# Patient Record
Sex: MALE | Race: WHITE | NOT HISPANIC OR LATINO | Employment: FULL TIME | URBAN - METROPOLITAN AREA
[De-identification: names, ages, dates, MRNs, and addresses within clinical notes are randomized per-mention and may not be internally consistent; named-entity substitution may affect disease eponyms.]

---

## 2023-09-15 PROBLEM — H91.91 DECREASED HEARING OF RIGHT EAR: Status: ACTIVE | Noted: 2023-09-15

## 2023-09-15 PROBLEM — F41.9 ANXIETY AND DEPRESSION: Status: ACTIVE | Noted: 2023-09-15

## 2023-09-15 PROBLEM — K21.9 ESOPHAGEAL REFLUX: Status: ACTIVE | Noted: 2023-09-15

## 2023-09-15 PROBLEM — R53.83 FATIGUE: Status: ACTIVE | Noted: 2023-09-15

## 2023-09-15 PROBLEM — K22.70 BARRETT'S ESOPHAGUS: Status: ACTIVE | Noted: 2023-09-15

## 2023-09-15 PROBLEM — F33.8 SEASONAL AFFECTIVE DISORDER (CMS-HCC): Status: ACTIVE | Noted: 2023-09-15

## 2023-09-15 PROBLEM — R45.4 IRRITABILITY: Status: ACTIVE | Noted: 2023-09-15

## 2023-09-15 PROBLEM — R25.1 TREMOR: Status: ACTIVE | Noted: 2023-09-15

## 2023-09-15 PROBLEM — F42.8 OBSESSIVE THINKING: Status: ACTIVE | Noted: 2023-09-15

## 2023-09-15 PROBLEM — E78.5 HYPERLIPIDEMIA: Status: ACTIVE | Noted: 2023-09-15

## 2023-09-15 PROBLEM — F32.5 MAJOR DEPRESSION IN REMISSION (CMS-HCC): Status: ACTIVE | Noted: 2023-09-15

## 2023-09-15 PROBLEM — G43.909 MIGRAINE: Status: ACTIVE | Noted: 2023-09-15

## 2023-09-15 PROBLEM — E66.3 OVERWEIGHT: Status: ACTIVE | Noted: 2023-09-15

## 2023-09-15 PROBLEM — H66.92 OTITIS MEDIA OF LEFT EAR: Status: ACTIVE | Noted: 2023-09-15

## 2023-09-15 PROBLEM — F32.A ANXIETY AND DEPRESSION: Status: ACTIVE | Noted: 2023-09-15

## 2023-09-15 RX ORDER — ALPRAZOLAM 1 MG/1
1 TABLET ORAL DAILY PRN
COMMUNITY
Start: 2019-08-21

## 2023-09-15 RX ORDER — ESCITALOPRAM OXALATE 10 MG/1
10 TABLET ORAL DAILY
COMMUNITY
End: 2024-01-24 | Stop reason: WASHOUT

## 2023-09-15 RX ORDER — BUPROPION HYDROCHLORIDE 300 MG/1
300 TABLET ORAL DAILY
COMMUNITY
End: 2023-12-18 | Stop reason: SDUPTHER

## 2023-09-15 RX ORDER — BUPROPION HYDROCHLORIDE 150 MG/1
1 TABLET ORAL DAILY
COMMUNITY
Start: 2019-09-18 | End: 2024-01-24 | Stop reason: WASHOUT

## 2023-09-15 RX ORDER — ESCITALOPRAM OXALATE 5 MG/1
5 TABLET ORAL DAILY
COMMUNITY
End: 2023-12-19

## 2023-09-15 RX ORDER — ESCITALOPRAM OXALATE 20 MG/1
1 TABLET ORAL DAILY
COMMUNITY
Start: 2019-08-21 | End: 2023-12-18 | Stop reason: SDUPTHER

## 2023-10-17 ENCOUNTER — TELEMEDICINE (OUTPATIENT)
Dept: BEHAVIORAL HEALTH | Facility: CLINIC | Age: 43
End: 2023-10-17
Payer: COMMERCIAL

## 2023-10-17 DIAGNOSIS — F41.9 ANXIETY: ICD-10-CM

## 2023-10-17 DIAGNOSIS — F32.5 MAJOR DEPRESSION IN REMISSION (CMS-HCC): ICD-10-CM

## 2023-10-17 DIAGNOSIS — F33.8 SEASONAL AFFECTIVE DISORDER (CMS-HCC): ICD-10-CM

## 2023-10-17 DIAGNOSIS — F42.8 OBSESSIVE THINKING: ICD-10-CM

## 2023-10-17 PROCEDURE — 99214 OFFICE O/P EST MOD 30 MIN: CPT | Performed by: NURSE PRACTITIONER

## 2023-10-17 ASSESSMENT — PATIENT HEALTH QUESTIONNAIRE - PHQ9
6. FEELING BAD ABOUT YOURSELF - OR THAT YOU ARE A FAILURE OR HAVE LET YOURSELF OR YOUR FAMILY DOWN: NOT AT ALL
2. FEELING DOWN, DEPRESSED OR HOPELESS: NOT AT ALL
3. TROUBLE FALLING OR STAYING ASLEEP OR SLEEPING TOO MUCH: NOT AT ALL
1. LITTLE INTEREST OR PLEASURE IN DOING THINGS: NOT AT ALL
5. POOR APPETITE OR OVEREATING: NOT AT ALL
9. THOUGHTS THAT YOU WOULD BE BETTER OFF DEAD, OR OF HURTING YOURSELF: NOT AT ALL
7. TROUBLE CONCENTRATING ON THINGS, SUCH AS READING THE NEWSPAPER OR WATCHING TELEVISION: NOT AT ALL
10. IF YOU CHECKED OFF ANY PROBLEMS, HOW DIFFICULT HAVE THESE PROBLEMS MADE IT FOR YOU TO DO YOUR WORK, TAKE CARE OF THINGS AT HOME, OR GET ALONG WITH OTHER PEOPLE: NOT DIFFICULT AT ALL
4. FEELING TIRED OR HAVING LITTLE ENERGY: NOT AT ALL
8. MOVING OR SPEAKING SO SLOWLY THAT OTHER PEOPLE COULD HAVE NOTICED. OR THE OPPOSITE, BEING SO FIGETY OR RESTLESS THAT YOU HAVE BEEN MOVING AROUND A LOT MORE THAN USUAL: NOT AT ALL

## 2023-10-17 ASSESSMENT — ANXIETY QUESTIONNAIRES
GAD7 TOTAL SCORE: 0
4. TROUBLE RELAXING: NOT AT ALL
2. NOT BEING ABLE TO STOP OR CONTROL WORRYING: NOT AT ALL
3. WORRYING TOO MUCH ABOUT DIFFERENT THINGS: NOT AT ALL
1. FEELING NERVOUS, ANXIOUS, OR ON EDGE: NOT AT ALL
7. FEELING AFRAID AS IF SOMETHING AWFUL MIGHT HAPPEN: NOT AT ALL
6. BECOMING EASILY ANNOYED OR IRRITABLE: NOT AT ALL
IF YOU CHECKED OFF ANY PROBLEMS ON THIS QUESTIONNAIRE, HOW DIFFICULT HAVE THESE PROBLEMS MADE IT FOR YOU TO DO YOUR WORK, TAKE CARE OF THINGS AT HOME, OR GET ALONG WITH OTHER PEOPLE: NOT DIFFICULT AT ALL
5. BEING SO RESTLESS THAT IT IS HARD TO SIT STILL: NOT AT ALL

## 2023-10-17 NOTE — PROGRESS NOTES
"Adult Ambulatory Psychiatry Progress Note      Assessment/Plan     Impression:  Boo De La O is a 42 y.o. male domiciled , employed as manager at [employer], who presents for follow up with CC of \"I am doing great!\"    Plan: No changes to tx plan. Returns in 2 months  Medication: Continues taking Lexapro 25mg every day, Wellbutrin XL 300mg every day, Xanax 1mg PRN (no refills).  Diagnoses and all orders for this visit:  Major depression in remission (CMS/HCC)  Obsessive thinking  Seasonal affective disorder (CMS/HCC)  Anxiety      Therapy: none    Other: n/a    Subjective   HPI:  \"Both the patient, and family and caregivers and guardians as appropriate, were informed of the current need to conduct treatment via telephone. I have confirmed the patient's identity via the following (minimum of three) acceptable identifiers as per  Policy PH-9: home address, , S.S.\"     Present Illness - anxiety and depression     Recent psychiatric symptoms (pertinent positives and negatives) - reports mentally feeling stable. Reports does couples therapy with his wife who's confirmed mood swings is d/t perimenopausal hormonal changes and is not as volatile as before. He will periodically see the individual therapist when his wife is traveling for work, but otherwise she sees the individual therapist on her own, while both the couples therapist and individual therapist converse together. Reports a lot of his wife's ongoing issues she brings up in therapy is her own past issues/trauma that she puts on the patient currently - blaming him and makes him out to be a problem in the marriage, and interestingly the therapists question him if he has issues and wants to talk about his past and he has said countless times that he has let go of them and not let them impact him currently. Reports both anxiety and depression remain stable. Reports both mental/physical fatigue and energy levels are not am issue. Denies S.A.D. even with the " fall weather, and when the temps went into the 70s and lata, he felt the mood shift down, but he made it through the down turn. Reports noticing his 10 yo. Daughter's mood/anxiety is up and down now and wonders if it is hormonal but also wonders if it is genetic as both sides of family mental history plays a role but also her lactose and gluten intolerance is an issue. Sleep averages 7.5-9 hrs and feels rested. Continues to deny experiencing racing, obsessive, intrusive or ruminating thoughts. Appetite has been 'normal.' Energy levels and mental/physical fatigue are 'good.'     Onset/timeframe - 6 weeks  Type - denies  Duration - denies  Aggravating and/or relieving factors/triggers - ongoing support with wife and her doing therapy, continually working on her own mental health and still maintaining healthy boundaries with himself in relation to her. Denies issues.  Related symptoms  Treatment and treatment changes (new meds, dosage increases or decreases, med compliance, therapy frequency, etc.) (Past and Recent) - Lexapro 25mg QD, Wellbutrin XL 300mg QD, Xanax 1mg PRN (no refills). Seeing BRIANNA Cruz-C every 6 weeks as a check in, while doing couple's therapy with wife as well, and the 2 therapists discuss the issues to remain on the same page.     Issues: Denies SI/HI/AVH, currently.           Review of Systems   Respiratory: Negative.     Cardiovascular: Negative.    Gastrointestinal: Negative.    Musculoskeletal: Negative.    Psychiatric/Behavioral: Negative.           Objective   Mental Status Exam:  General Appearance: Well groomed, appropriate eye contact  Attitude/Behavior: Cooperative  Motor: No psychomotor agitation or retardation, no tremor or other abnormal movements  Speech: Normal rate, volume, prosody  Gait/Station: Other:(comment) (sitting in car seat over ZOOM)  Mood: 'great'  Affect: Euthymic, full-range, Congruent with mood and topic of conversation  Thought Process: Linear, goal  directed  Thought Associations: No loosening of associations  Thought Content: Normal  Perception: No perceptual abnormalities noted  Sensorium: Alert and oriented to person, place, time and situation  Insight: Intact  Judgement: Intact  Testing: N/A    JAMISON-7/PHQ-9 scores reviewed: 0, 0 compared to 0, 0 reflecting stability with anxiety and mood/depression.    Current Medications:  Current Outpatient Medications on File Prior to Visit   Medication Sig Dispense Refill    ALPRAZolam (Xanax) 1 mg tablet Take 1 tablet (1 mg) by mouth once daily as needed for anxiety.      buPROPion XL (Wellbutrin XL) 300 mg 24 hr tablet Take 1 tablet (300 mg) by mouth once daily. Do not crush, chew, or split.      escitalopram (Lexapro) 20 mg tablet Take 1 tablet (20 mg) by mouth once daily.      escitalopram (Lexapro) 5 mg tablet Take 1 tablet (5 mg) by mouth once daily.      buPROPion XL (Wellbutrin XL) 150 mg 24 hr tablet Take 1 tablet (150 mg) by mouth once daily.      escitalopram (Lexapro) 10 mg tablet Take 1 tablet (10 mg) by mouth once daily.       No current facility-administered medications on file prior to visit.       Lab Review:   not applicable      Time Spent:    Prep time: 1 min.  Direct patient time: 25 min.  Documentation time: 4 min.   Total time: 30 min.    Next Appointment:  Follow up in about 2 months (around 12/17/2023).

## 2023-10-18 ASSESSMENT — ENCOUNTER SYMPTOMS
MUSCULOSKELETAL NEGATIVE: 1
GASTROINTESTINAL NEGATIVE: 1
PSYCHIATRIC NEGATIVE: 1
RESPIRATORY NEGATIVE: 1
CARDIOVASCULAR NEGATIVE: 1

## 2023-12-18 ENCOUNTER — TELEMEDICINE (OUTPATIENT)
Dept: BEHAVIORAL HEALTH | Facility: CLINIC | Age: 43
End: 2023-12-18
Payer: COMMERCIAL

## 2023-12-18 DIAGNOSIS — F32.1 CURRENT MODERATE EPISODE OF MAJOR DEPRESSIVE DISORDER, UNSPECIFIED WHETHER RECURRENT (MULTI): ICD-10-CM

## 2023-12-18 DIAGNOSIS — F42.8 OBSESSIVE THINKING: ICD-10-CM

## 2023-12-18 DIAGNOSIS — F33.8 SEASONAL AFFECTIVE DISORDER (CMS-HCC): Primary | ICD-10-CM

## 2023-12-18 DIAGNOSIS — F41.9 ANXIETY: ICD-10-CM

## 2023-12-18 PROCEDURE — 99214 OFFICE O/P EST MOD 30 MIN: CPT | Performed by: NURSE PRACTITIONER

## 2023-12-18 RX ORDER — ESCITALOPRAM OXALATE 20 MG/1
20 TABLET ORAL DAILY
Qty: 90 TABLET | Refills: 3 | Status: SHIPPED | OUTPATIENT
Start: 2023-12-18 | End: 2024-12-17

## 2023-12-18 RX ORDER — BUPROPION HYDROCHLORIDE 300 MG/1
300 TABLET ORAL DAILY
Qty: 90 TABLET | Refills: 3 | Status: SHIPPED | OUTPATIENT
Start: 2023-12-18 | End: 2023-12-19

## 2023-12-18 RX ORDER — ARIPIPRAZOLE 2 MG/1
1 TABLET ORAL DAILY
Qty: 30 TABLET | Refills: 0 | Status: SHIPPED | OUTPATIENT
Start: 2023-12-18 | End: 2024-01-24 | Stop reason: WASHOUT

## 2023-12-18 ASSESSMENT — PATIENT HEALTH QUESTIONNAIRE - PHQ9
5. POOR APPETITE OR OVEREATING: MORE THAN HALF THE DAYS
4. FEELING TIRED OR HAVING LITTLE ENERGY: MORE THAN HALF THE DAYS
9. THOUGHTS THAT YOU WOULD BE BETTER OFF DEAD, OR OF HURTING YOURSELF: NOT AT ALL
8. MOVING OR SPEAKING SO SLOWLY THAT OTHER PEOPLE COULD HAVE NOTICED. OR THE OPPOSITE, BEING SO FIGETY OR RESTLESS THAT YOU HAVE BEEN MOVING AROUND A LOT MORE THAN USUAL: SEVERAL DAYS
6. FEELING BAD ABOUT YOURSELF - OR THAT YOU ARE A FAILURE OR HAVE LET YOURSELF OR YOUR FAMILY DOWN: SEVERAL DAYS
10. IF YOU CHECKED OFF ANY PROBLEMS, HOW DIFFICULT HAVE THESE PROBLEMS MADE IT FOR YOU TO DO YOUR WORK, TAKE CARE OF THINGS AT HOME, OR GET ALONG WITH OTHER PEOPLE: SOMEWHAT DIFFICULT
1. LITTLE INTEREST OR PLEASURE IN DOING THINGS: MORE THAN HALF THE DAYS
3. TROUBLE FALLING OR STAYING ASLEEP OR SLEEPING TOO MUCH: NEARLY EVERY DAY
7. TROUBLE CONCENTRATING ON THINGS, SUCH AS READING THE NEWSPAPER OR WATCHING TELEVISION: MORE THAN HALF THE DAYS
2. FEELING DOWN, DEPRESSED OR HOPELESS: MORE THAN HALF THE DAYS

## 2023-12-18 ASSESSMENT — ENCOUNTER SYMPTOMS
CARDIOVASCULAR NEGATIVE: 1
PSYCHIATRIC NEGATIVE: 1
GASTROINTESTINAL NEGATIVE: 1
RESPIRATORY NEGATIVE: 1

## 2023-12-18 ASSESSMENT — ANXIETY QUESTIONNAIRES
5. BEING SO RESTLESS THAT IT IS HARD TO SIT STILL: NOT AT ALL
7. FEELING AFRAID AS IF SOMETHING AWFUL MIGHT HAPPEN: SEVERAL DAYS
IF YOU CHECKED OFF ANY PROBLEMS ON THIS QUESTIONNAIRE, HOW DIFFICULT HAVE THESE PROBLEMS MADE IT FOR YOU TO DO YOUR WORK, TAKE CARE OF THINGS AT HOME, OR GET ALONG WITH OTHER PEOPLE: SOMEWHAT DIFFICULT
GAD7 TOTAL SCORE: 8
1. FEELING NERVOUS, ANXIOUS, OR ON EDGE: MORE THAN HALF THE DAYS
6. BECOMING EASILY ANNOYED OR IRRITABLE: SEVERAL DAYS
2. NOT BEING ABLE TO STOP OR CONTROL WORRYING: SEVERAL DAYS
4. TROUBLE RELAXING: MORE THAN HALF THE DAYS
3. WORRYING TOO MUCH ABOUT DIFFERENT THINGS: SEVERAL DAYS

## 2023-12-18 NOTE — PROGRESS NOTES
"Adult Ambulatory Psychiatry Progress Note      Assessment/Plan     Impression:  Boo De La O is a 43 y.o. male domiciled , employed as manager at [employer], who presents for follow up with CC of \"At end of our last appointment, I noticed I was starting to slow down, where I was becoming slowly less motivated, not wanting to get out of bed. It really hit me at the end of November, I noticed I was maybe even wavy of trending better, than I would drop down. I had not been working out. I was not feeling  my best with belly issues - I was nauseated, cramping and it lasted me awhile. I felt tired a lot. It lasted me a couple of days.\"    Plan: Reviewed and agreed to adding on low dose of Abilify for seasonal depression (1/2 tab to start) but patient will start taking it only when and if he notices the depression is back and consistently impacting him. No other changes to medications and treatment plan. Next appt in person    Medication: Starts Abilify 2mg (will take 1/2 tablet - 1mg) every day. Continues taking Lexapro 25mg every day, Wellbutrin XL 300mg every day, Xanax 1mg PRN (no refills).    Diagnoses and all orders for this visit:  Seasonal affective disorder (CMS/HCC)  -     ARIPiprazole (Abilify) 2 mg tablet; Take 0.5 tablets (1 mg) by mouth once daily.  -     escitalopram (Lexapro) 20 mg tablet; Take 1 tablet (20 mg) by mouth once daily.  Anxiety  -     ARIPiprazole (Abilify) 2 mg tablet; Take 0.5 tablets (1 mg) by mouth once daily.  -     escitalopram (Lexapro) 20 mg tablet; Take 1 tablet (20 mg) by mouth once daily.  Current moderate episode of major depressive disorder, unspecified whether recurrent (CMS/HCC)  -     ARIPiprazole (Abilify) 2 mg tablet; Take 0.5 tablets (1 mg) by mouth once daily.  -     escitalopram (Lexapro) 20 mg tablet; Take 1 tablet (20 mg) by mouth once daily.  Obsessive thinking      Therapy: none    Other: n/a    Subjective   HPI:  \"Both the patient, and family and caregivers and " "guardians as appropriate, were informed of the current need to conduct treatment via telephone. I have confirmed the patient's identity via the following (minimum of three) acceptable identifiers as per  Policy PH-9: home address, , S.S.\"     Present Illness - seasonal depression     Recent psychiatric symptoms (pertinent positives and negatives) - reports mentally feeling 'off'. Reports his relationship with his wife has been fine as they are doing therapy and 'she's starting to warm up. She had to travel for work and missed several sessions but we are talking and she was open about things.' Reports work is fine, the family is good. Reports normal stressors are manageable but 'now that I have been feeling more down than normal, they are triggering me and I feel like look at me, yay! I feel so great.' Reports appetite has been down in general, and has lost 5 lbs overall since the last appt. Reports feeling unmotivated increasingly, more down, sad. Reports it is harder for him to come home and want to shop and cook for the family. Reports after cooking has no desire to want to clean up and wash dishes, or even 'want to go out into the shop and cut some wood. Nope, I want to just sit in the chair and scroll on my phone instead. And my biggest test is to play online chess and I couldn't concentrate.' Reports noticing that as the efficacy of the medications are not helping with managing his mood, the s/e get worse (eg noticed increased tremors of his hands - reflecting the Wellbutrin), and finds himself when feeling down 'I have had more trouble finding words at work to express myself when talking. The brain is spinning but nothing is flowing out.' Reports in his team, 'we often just have casual b.s. talk and I am just stumbling and stuttering out words.' Feels like he is just now coming back out of a deep low. Reports feeling sleep quality 'has not been great lately. I will have trouble with falling asleep staring " at the ceiling for an hour and other times I will wake up to pee and then my mind is wide awake and I am like fuck I can't fall back to sleep.' Reports other mornings will have trouble wanting to get out of bed. Reports sleep had increased to 9 hours all of last week but still not restful, but most nights were averaging 7 hours. Reports can make it through his work day but is 'at 60% of where it normally would be over the summer', but also notices some seasonal depression where it is so dark at 6pm to 6am, that his energy levels drop off and can feel the mental and physical fatigue hit a wall. Reports a work stress triggered some ruminating thoughts last week 'and I had to take a 1/2 a Xanax to stop them, but if I was not depressed it would not have happened.' Denies racing, obsessive, or intrusive thoughts.     Onset/timeframe - 4 weeks  Type - seasonal depression  Duration - daily  Aggravating and/or relieving factors/triggers - winter time seasonal changes especially the increased dark from 6p-6a is hitting him harder than usual as seasonal depression hits him usually over the summer  Related symptoms  Treatment and treatment changes (new meds, dosage increases or decreases, med compliance, therapy frequency, etc.) (Past and Recent) - Lexapro 25mg QD, Wellbutrin XL 300mg QD, Xanax 1mg PRN (no refills). Seeing BRIANNA Cruz-SLICK every 6 weeks as a check in, while doing couple's therapy with wife as well, and the 2 therapists discuss the issues to remain on the same page.     Issues: Denies SI/HI/AVH, currently.     Reports some family members have been dx with depression even late life depression, including alcoholism.          Review of Systems   Respiratory: Negative.     Cardiovascular: Negative.    Gastrointestinal: Negative.    Neurological: Negative.    Psychiatric/Behavioral: Negative.           Objective   Mental Status Exam:  General Appearance: Well groomed, appropriate eye contact  Attitude/Behavior:  Cooperative, Guarded  Motor: No psychomotor agitation or retardation, no tremor or other abnormal movements  Speech: Normal rate, volume, prosody  Gait/Station: Other:(comment) (sitting in car at work, over virtual connection)  Mood: 'down, anxious but getting better?'  Affect: Blunted, Anxious, Congruent with mood and topic of conversation  Thought Process: Linear, goal directed, Paucity of content  Thought Associations: No loosening of associations  Thought Content: Normal  Perception: No perceptual abnormalities noted  Sensorium: Alert and oriented to person, place, time and situation  Insight: Intact  Judgement: Intact  Cognition: Cognitively intact to conversational testing with respect to attention, orientation, fund of knowledge, recent and remote memory, and language  Testing: N/A    JAMISON-7/PHQ-9 scores reviewed: 8, 15 compared to 0, 0 reflecting significant increases in both anxiety but especially mood/depression.    Current Medications:  Current Outpatient Medications on File Prior to Visit   Medication Sig Dispense Refill    ALPRAZolam (Xanax) 1 mg tablet Take 1 tablet (1 mg) by mouth once daily as needed for anxiety.      [DISCONTINUED] buPROPion XL (Wellbutrin XL) 300 mg 24 hr tablet Take 1 tablet (300 mg) by mouth once daily. Do not crush, chew, or split.      [DISCONTINUED] escitalopram (Lexapro) 20 mg tablet Take 1 tablet (20 mg) by mouth once daily.      [DISCONTINUED] escitalopram (Lexapro) 5 mg tablet Take 1 tablet (5 mg) by mouth once daily.      buPROPion XL (Wellbutrin XL) 150 mg 24 hr tablet Take 1 tablet (150 mg) by mouth once daily.      escitalopram (Lexapro) 10 mg tablet Take 1 tablet (10 mg) by mouth once daily.       No current facility-administered medications on file prior to visit.       Lab Review:   not applicable      Time Spent:    Prep time: 1 min.  Direct patient time: 28 min.  Documentation time: 5 min.   Total time: 34 min.    Next Appointment:  Follow up in about 6 weeks  (around 1/29/2024).

## 2023-12-19 ASSESSMENT — ENCOUNTER SYMPTOMS: NEUROLOGICAL NEGATIVE: 1

## 2024-01-24 ENCOUNTER — OFFICE VISIT (OUTPATIENT)
Dept: PRIMARY CARE | Facility: CLINIC | Age: 44
End: 2024-01-24
Payer: COMMERCIAL

## 2024-01-24 VITALS
HEART RATE: 60 BPM | HEIGHT: 75 IN | BODY MASS INDEX: 30.71 KG/M2 | DIASTOLIC BLOOD PRESSURE: 75 MMHG | WEIGHT: 247 LBS | SYSTOLIC BLOOD PRESSURE: 114 MMHG | TEMPERATURE: 97.1 F

## 2024-01-24 DIAGNOSIS — Z00.00 WELLNESS EXAMINATION: Primary | ICD-10-CM

## 2024-01-24 DIAGNOSIS — K22.719 BARRETT'S ESOPHAGUS WITH DYSPLASIA: ICD-10-CM

## 2024-01-24 DIAGNOSIS — Z12.5 PROSTATE CANCER SCREENING: ICD-10-CM

## 2024-01-24 DIAGNOSIS — Z00.00 WELL ADULT HEALTH CHECK: ICD-10-CM

## 2024-01-24 DIAGNOSIS — E78.5 HYPERLIPIDEMIA, UNSPECIFIED HYPERLIPIDEMIA TYPE: ICD-10-CM

## 2024-01-24 DIAGNOSIS — F41.8 MIXED ANXIETY DEPRESSIVE DISORDER: ICD-10-CM

## 2024-01-24 DIAGNOSIS — K21.9 GASTROESOPHAGEAL REFLUX DISEASE WITHOUT ESOPHAGITIS: ICD-10-CM

## 2024-01-24 PROCEDURE — 3008F BODY MASS INDEX DOCD: CPT | Performed by: INTERNAL MEDICINE

## 2024-01-24 PROCEDURE — 1036F TOBACCO NON-USER: CPT | Performed by: INTERNAL MEDICINE

## 2024-01-24 PROCEDURE — 99396 PREV VISIT EST AGE 40-64: CPT | Performed by: INTERNAL MEDICINE

## 2024-01-24 ASSESSMENT — PROMIS GLOBAL HEALTH SCALE
RATE_AVERAGE_PAIN: 0
RATE_PHYSICAL_HEALTH: VERY GOOD
EMOTIONAL_PROBLEMS: RARELY
RATE_GENERAL_HEALTH: VERY GOOD
RATE_SOCIAL_SATISFACTION: VERY GOOD
RATE_MENTAL_HEALTH: GOOD
CARRYOUT_SOCIAL_ACTIVITIES: VERY GOOD
RATE_QUALITY_OF_LIFE: VERY GOOD
CARRYOUT_PHYSICAL_ACTIVITIES: COMPLETELY

## 2024-01-24 NOTE — PROGRESS NOTES
Assessment/Plan   Problem List Items Addressed This Visit       Garcia's esophagus    Esophageal reflux    Hyperlipidemia    Relevant Orders    Lipid Panel    TSH with reflex to Free T4 if abnormal    Mixed anxiety depressive disorder    Wellness examination - Primary     Other Visit Diagnoses       Well adult health check        Relevant Orders    CBC    Comprehensive Metabolic Panel    BMI 30.0-30.9,adult        Relevant Orders    TSH with reflex to Free T4 if abnormal    Prostate cancer screening        Relevant Orders    Prostate Specific Antigen, Screen        Labs as ordered  Depending on the labs we will see sooner otherwise in a year time  He goes to psychiatrist for his psychiatric conditions and is a stable on the current treatment plan  He is also going to the eye doctor for regular checkup  He has also been following the gastroenterologist and endoscopy for Garcia's esophagus is being already planned  Vaccination discussed  He is not sure about the vaccination  Subjective   Patient ID: Boo De La O is a 43 y.o. male who presents for Annual Exam.    Past Surgical History:   Procedure Laterality Date    OTHER SURGICAL HISTORY  01/13/2022    Tooth extraction    OTHER SURGICAL HISTORY  01/13/2022    Shoulder surgery    OTHER SURGICAL HISTORY  01/13/2022    Vasectomy      Family History   Problem Relation Name Age of Onset    Anxiety disorder Mother      Anxiety disorder Father      Alcohol abuse Father      Alcohol abuse Father's Sister      Anxiety disorder Father's Sister      Anxiety disorder Father's Brother      Alcohol abuse Paternal Grandfather      Alcohol abuse Other Cousin     OCD Other Cousin       Social History     Socioeconomic History    Marital status:      Spouse name: Not on file    Number of children: Not on file    Years of education: Not on file    Highest education level: Not on file   Occupational History    Not on file   Tobacco Use    Smoking status: Never     Passive  "exposure: Never    Smokeless tobacco: Never   Substance and Sexual Activity    Alcohol use: Yes     Alcohol/week: 2.0 standard drinks of alcohol     Types: 1 Glasses of wine, 1 Cans of beer per week    Drug use: Never    Sexual activity: Not on file   Other Topics Concern    Not on file   Social History Narrative    Not on file     Social Determinants of Health     Financial Resource Strain: Not on file   Food Insecurity: Not on file   Transportation Needs: Not on file   Physical Activity: Not on file   Stress: Not on file   Social Connections: Not on file   Intimate Partner Violence: Not on file   Housing Stability: Not on file      Patient has no known allergies.   Current Outpatient Medications   Medication Sig Dispense Refill    ALPRAZolam (Xanax) 1 mg tablet Take 1 tablet (1 mg) by mouth once daily as needed for anxiety.      buPROPion XL (Wellbutrin XL) 300 mg 24 hr tablet TAKE 1 TABLET BY MOUTH EVERY DAY 90 tablet 0    escitalopram (Lexapro) 20 mg tablet Take 1 tablet (20 mg) by mouth once daily. 90 tablet 3    escitalopram (Lexapro) 5 mg tablet TAKE 1 TABLET BY MOUTH EVERY DAY 90 tablet 0     No current facility-administered medications for this visit.      Vitals:    01/24/24 0826   BP: 114/75   BP Location: Left arm   Patient Position: Sitting   Pulse: 60   Temp: 36.2 °C (97.1 °F)   Weight: 112 kg (247 lb)   Height: 1.905 m (6' 3\")      Problem List Items Addressed This Visit       Garcia's esophagus    Esophageal reflux    Hyperlipidemia    Relevant Orders    Lipid Panel    TSH with reflex to Free T4 if abnormal    Mixed anxiety depressive disorder    Wellness examination - Primary     Other Visit Diagnoses       Well adult health check        Relevant Orders    CBC    Comprehensive Metabolic Panel    BMI 30.0-30.9,adult        Relevant Orders    TSH with reflex to Free T4 if abnormal    Prostate cancer screening        Relevant Orders    Prostate Specific Antigen, Screen           Orders Placed This " "Encounter   Procedures    CBC     Standing Status:   Future     Standing Expiration Date:   1/24/2025     Order Specific Question:   Release result to MyChart     Answer:   Immediate    Comprehensive Metabolic Panel     Standing Status:   Future     Standing Expiration Date:   1/24/2025     Order Specific Question:   Release result to MyChart     Answer:   Immediate    Lipid Panel     Standing Status:   Future     Standing Expiration Date:   1/24/2025     Order Specific Question:   Release result to MyChart     Answer:   Immediate    TSH with reflex to Free T4 if abnormal     Standing Status:   Future     Standing Expiration Date:   1/24/2025     Order Specific Question:   Release result to MyChart     Answer:   Immediate    Prostate Specific Antigen, Screen     Standing Status:   Future     Standing Expiration Date:   1/24/2025     Order Specific Question:   Release result to MyChart     Answer:   Immediate [1]        HPI  Came for wellness visit  Doing well  Migraine under control  Tremor and anxiety under control    ROS negative  Past medical history reviewed  Social and family history reviewed  Allergies and medications reviewed  Recent labs reviewed  Vital signs reviewed    PHYSICAL EXAM  Cardiovascular chest abdominal neurological examination is normal    No results found for: \"PR1\", \"BMPR1A\", \"CMPLAS\", \"UI1OPFGV\", \"KPSAT\"   Lab Results   Component Value Date    CHOL 227 (H) 01/30/2023    CHHDL 4.2 01/30/2023                "

## 2024-02-02 ENCOUNTER — OFFICE VISIT (OUTPATIENT)
Dept: BEHAVIORAL HEALTH | Facility: CLINIC | Age: 44
End: 2024-02-02
Payer: COMMERCIAL

## 2024-02-02 ENCOUNTER — LAB (OUTPATIENT)
Dept: LAB | Facility: LAB | Age: 44
End: 2024-02-02
Payer: COMMERCIAL

## 2024-02-02 VITALS
HEART RATE: 67 BPM | WEIGHT: 250.5 LBS | TEMPERATURE: 98.1 F | RESPIRATION RATE: 16 BRPM | DIASTOLIC BLOOD PRESSURE: 83 MMHG | HEIGHT: 75 IN | BODY MASS INDEX: 31.14 KG/M2 | SYSTOLIC BLOOD PRESSURE: 129 MMHG

## 2024-02-02 DIAGNOSIS — F42.8 OBSESSIVE THINKING: ICD-10-CM

## 2024-02-02 DIAGNOSIS — Z12.5 PROSTATE CANCER SCREENING: ICD-10-CM

## 2024-02-02 DIAGNOSIS — F41.9 ANXIETY: ICD-10-CM

## 2024-02-02 DIAGNOSIS — E78.5 HYPERLIPIDEMIA, UNSPECIFIED HYPERLIPIDEMIA TYPE: ICD-10-CM

## 2024-02-02 DIAGNOSIS — F33.8 SEASONAL AFFECTIVE DISORDER (CMS-HCC): ICD-10-CM

## 2024-02-02 DIAGNOSIS — Z00.00 WELL ADULT HEALTH CHECK: ICD-10-CM

## 2024-02-02 DIAGNOSIS — F32.5 MAJOR DEPRESSION IN REMISSION (CMS-HCC): ICD-10-CM

## 2024-02-02 PROBLEM — G43.909 MIGRAINE: Status: RESOLVED | Noted: 2023-09-15 | Resolved: 2024-02-02

## 2024-02-02 PROBLEM — R53.83 FATIGUE: Status: RESOLVED | Noted: 2023-09-15 | Resolved: 2024-02-02

## 2024-02-02 PROBLEM — E66.9 OBESITY WITH BODY MASS INDEX 30 OR GREATER: Status: ACTIVE | Noted: 2024-02-02

## 2024-02-02 PROBLEM — H66.92 OTITIS MEDIA OF LEFT EAR: Status: RESOLVED | Noted: 2023-09-15 | Resolved: 2024-02-02

## 2024-02-02 PROBLEM — R45.4 IRRITABILITY: Status: RESOLVED | Noted: 2023-09-15 | Resolved: 2024-02-02

## 2024-02-02 LAB
ALBUMIN SERPL BCP-MCNC: 4.7 G/DL (ref 3.4–5)
ALP SERPL-CCNC: 60 U/L (ref 33–120)
ALT SERPL W P-5'-P-CCNC: 26 U/L (ref 10–52)
ANION GAP SERPL CALC-SCNC: 12 MMOL/L (ref 10–20)
AST SERPL W P-5'-P-CCNC: 24 U/L (ref 9–39)
BILIRUB SERPL-MCNC: 0.7 MG/DL (ref 0–1.2)
BUN SERPL-MCNC: 17 MG/DL (ref 6–23)
CALCIUM SERPL-MCNC: 9.3 MG/DL (ref 8.6–10.3)
CHLORIDE SERPL-SCNC: 104 MMOL/L (ref 98–107)
CHOLEST SERPL-MCNC: 233 MG/DL (ref 0–199)
CHOLESTEROL/HDL RATIO: 4.3
CO2 SERPL-SCNC: 28 MMOL/L (ref 21–32)
CREAT SERPL-MCNC: 1.25 MG/DL (ref 0.5–1.3)
EGFRCR SERPLBLD CKD-EPI 2021: 74 ML/MIN/1.73M*2
ERYTHROCYTE [DISTWIDTH] IN BLOOD BY AUTOMATED COUNT: 12.2 % (ref 11.5–14.5)
GLUCOSE SERPL-MCNC: 97 MG/DL (ref 74–99)
HCT VFR BLD AUTO: 45.1 % (ref 41–52)
HDLC SERPL-MCNC: 54 MG/DL
HGB BLD-MCNC: 15.6 G/DL (ref 13.5–17.5)
LDLC SERPL CALC-MCNC: 166 MG/DL
MCH RBC QN AUTO: 32.2 PG (ref 26–34)
MCHC RBC AUTO-ENTMCNC: 34.6 G/DL (ref 32–36)
MCV RBC AUTO: 93 FL (ref 80–100)
NON HDL CHOLESTEROL: 179 MG/DL (ref 0–149)
NRBC BLD-RTO: 0 /100 WBCS (ref 0–0)
PLATELET # BLD AUTO: 290 X10*3/UL (ref 150–450)
POTASSIUM SERPL-SCNC: 4.2 MMOL/L (ref 3.5–5.3)
PROT SERPL-MCNC: 7 G/DL (ref 6.4–8.2)
PSA SERPL-MCNC: 0.89 NG/ML
RBC # BLD AUTO: 4.85 X10*6/UL (ref 4.5–5.9)
SODIUM SERPL-SCNC: 140 MMOL/L (ref 136–145)
TRIGL SERPL-MCNC: 63 MG/DL (ref 0–149)
TSH SERPL-ACNC: 2.14 MIU/L (ref 0.44–3.98)
VLDL: 13 MG/DL (ref 0–40)
WBC # BLD AUTO: 4.8 X10*3/UL (ref 4.4–11.3)

## 2024-02-02 PROCEDURE — 80053 COMPREHEN METABOLIC PANEL: CPT

## 2024-02-02 PROCEDURE — 80061 LIPID PANEL: CPT

## 2024-02-02 PROCEDURE — 3008F BODY MASS INDEX DOCD: CPT | Performed by: NURSE PRACTITIONER

## 2024-02-02 PROCEDURE — 99214 OFFICE O/P EST MOD 30 MIN: CPT | Performed by: NURSE PRACTITIONER

## 2024-02-02 PROCEDURE — 84443 ASSAY THYROID STIM HORMONE: CPT

## 2024-02-02 PROCEDURE — 85027 COMPLETE CBC AUTOMATED: CPT

## 2024-02-02 PROCEDURE — 84153 ASSAY OF PSA TOTAL: CPT

## 2024-02-02 PROCEDURE — 1036F TOBACCO NON-USER: CPT | Performed by: NURSE PRACTITIONER

## 2024-02-02 PROCEDURE — 36415 COLL VENOUS BLD VENIPUNCTURE: CPT

## 2024-02-02 RX ORDER — FERROUS SULFATE, DRIED 160(50) MG
1 TABLET, EXTENDED RELEASE ORAL DAILY
COMMUNITY

## 2024-02-02 RX ORDER — ESOMEPRAZOLE MAGNESIUM 40 MG/1
20 GRANULE, DELAYED RELEASE ORAL
COMMUNITY

## 2024-02-02 RX ORDER — MAGNESIUM GLYCINATE 100 MG
100 CAPSULE ORAL DAILY
COMMUNITY

## 2024-02-02 ASSESSMENT — PATIENT HEALTH QUESTIONNAIRE - PHQ9
9. THOUGHTS THAT YOU WOULD BE BETTER OFF DEAD, OR OF HURTING YOURSELF: NOT AT ALL
7. TROUBLE CONCENTRATING ON THINGS, SUCH AS READING THE NEWSPAPER OR WATCHING TELEVISION: NOT AT ALL
2. FEELING DOWN, DEPRESSED OR HOPELESS: NOT AT ALL
4. FEELING TIRED OR HAVING LITTLE ENERGY: NOT AT ALL
6. FEELING BAD ABOUT YOURSELF - OR THAT YOU ARE A FAILURE OR HAVE LET YOURSELF OR YOUR FAMILY DOWN: NOT AT ALL
8. MOVING OR SPEAKING SO SLOWLY THAT OTHER PEOPLE COULD HAVE NOTICED. OR THE OPPOSITE, BEING SO FIGETY OR RESTLESS THAT YOU HAVE BEEN MOVING AROUND A LOT MORE THAN USUAL: NOT AT ALL
5. POOR APPETITE OR OVEREATING: NOT AT ALL
1. LITTLE INTEREST OR PLEASURE IN DOING THINGS: NOT AT ALL
3. TROUBLE FALLING OR STAYING ASLEEP OR SLEEPING TOO MUCH: NOT AT ALL
10. IF YOU CHECKED OFF ANY PROBLEMS, HOW DIFFICULT HAVE THESE PROBLEMS MADE IT FOR YOU TO DO YOUR WORK, TAKE CARE OF THINGS AT HOME, OR GET ALONG WITH OTHER PEOPLE: NOT DIFFICULT AT ALL

## 2024-02-02 ASSESSMENT — ANXIETY QUESTIONNAIRES
5. BEING SO RESTLESS THAT IT IS HARD TO SIT STILL: NOT AT ALL
2. NOT BEING ABLE TO STOP OR CONTROL WORRYING: NOT AT ALL
1. FEELING NERVOUS, ANXIOUS, OR ON EDGE: NOT AT ALL
IF YOU CHECKED OFF ANY PROBLEMS ON THIS QUESTIONNAIRE, HOW DIFFICULT HAVE THESE PROBLEMS MADE IT FOR YOU TO DO YOUR WORK, TAKE CARE OF THINGS AT HOME, OR GET ALONG WITH OTHER PEOPLE: NOT DIFFICULT AT ALL
6. BECOMING EASILY ANNOYED OR IRRITABLE: NOT AT ALL
3. WORRYING TOO MUCH ABOUT DIFFERENT THINGS: NOT AT ALL
GAD7 TOTAL SCORE: 0
7. FEELING AFRAID AS IF SOMETHING AWFUL MIGHT HAPPEN: NOT AT ALL
4. TROUBLE RELAXING: NOT AT ALL

## 2024-02-02 ASSESSMENT — ENCOUNTER SYMPTOMS
RESPIRATORY NEGATIVE: 1
CARDIOVASCULAR NEGATIVE: 1
GASTROINTESTINAL NEGATIVE: 1

## 2024-02-02 NOTE — PROGRESS NOTES
"Adult Ambulatory Psychiatry Progress Note      Assessment/Plan     Impression:  Boo De La O is a 43 y.o. male domiciled , employed as manager at [employer], who presents for follow up with CC of \"At end of our last appointment, I noticed I was starting to slow down, where I was becoming slowly less motivated, not wanting to get out of bed. It really hit me at the end of November, I noticed I was maybe even wavy of trending better, than I would drop down. I had not been working out. I was not feeling  my best with belly issues - I was nauseated, cramping and it lasted me awhile. I felt tired a lot. It lasted me a couple of days.\"    Plan: Reviewed and agreed to discontinuing Abilify as it was never picked or used. As he reports and presents himself to be stable, no other changes to medications and treatment plan.     Medication: Discontinued Abilify. Continues taking Lexapro 25mg every day, Wellbutrin XL 300mg every day, Xanax 1mg PRN (no refills).    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 I have confirmed the patient's identity via the following (minimum of three) acceptable identifiers as per  Policy PH-9: home address, , S.S.\"     Present Illness - anxiety     Recent psychiatric symptoms (pertinent positives and negatives) - reports some anxiety but tied that to stress with his wife's mood swings as she is francesco-menopausal. Now seeing a a , who uses a personality enneagram (since just after the last appt with this note-writer) to help him understand their different personalities and how to work through their differences. Indicated how he is working on not reacting when she is upset as it is " her feelings and not be responsive to her issues or get emotionally attached to the emotions and just be supportive. Reports it finds it to be helpful for him, 'as it aligns with Becka and her traits and tendencies and helps me understand her a lot better.' Reports with suspecting that his depression and anxiety at the last appt was due to taking the wrong dose of Wellbutrin for 1 week, inadvertently putting the pills in his weekly pill case (stumbled upon the bottles in his pill drawer and mistook the old 150mg for the 300mg), he replaced the dose and started to feel better. Reports since the last appt feels stable again. Denies no impact by seasonal changes. Reports his family is doing 'good' and work is 'great'. Admits that when feeling 'down, work is not great, but when feeling great, I can deal with it (stress).' Reports appetite has been 'healthy and steady. I am hitting the gym and I am talking to nutritionists when there and they are telling me to eat more protein and eliminating more carbs and my waist has shrunk down.' Reports feeling sleep quality 'is really good. Falling asleep is not the easiest but it's not really a problem. I still get about 8 hours.' May experience some racing and obsessive thoughts that will keep him awake as he falls asleep but will fall asleep eventually but if 'I work out, I know that I will fall asleep really quickly. I know that if I stay up later past 9pm, I know the next day, my afternoon will be so bad that I will need coffee to keep me awake as otherwise I will feel sucky. I keep to my routine, even on weekends.' Reports overall denies racing, obsessive, ruminating, intrusive thoughts.      Onset/timeframe - 6 weeks  Type - anxiety  Duration - situational  Aggravating and/or relieving factors/triggers - only has anxiety when dealing with his wife's mood swings and is learning through help with a  how to manage his anxiety in response.  Related symptoms  Treatment  and treatment changes (new meds, dosage increases or decreases, med compliance, therapy frequency, etc.) (Past and Recent) - Lexapro 25mg QD, Wellbutrin XL 300mg QD, Xanax 1mg PRN (no refills). Doing  sessions every 1-2 weeks, Edgar Lomas. Marriage counselor encouraged patient to do  to do middle ground therapy for his wife, while his wife continues her own therapy, and search for a new couples therapist.      Issues: Denies SI/HI/AVH, currently. Never picked up Abilify as he was starting to feel better and not irritable or depressed soon after the last appt.          Review of Systems   Respiratory: Negative.     Cardiovascular: Negative.    Gastrointestinal: Negative.    Genitourinary: Negative.    Musculoskeletal: Negative.        OARRS: Reviewed OARRS on 02/02/2024 by YOUSIF Ford-CNP -OARRS has been reviewed and is consistent with prescribed medications, Considered the risks of abuse, dependence, addiction and diversion, Medication is felt to be clinically appropriate based on documented diagnosis    I have personally reviewed the OARRS report for Boo De La O. I have considered the risks of abuse, dependence, addiction and diversion    Is the patient prescribed a combination of a benzodiazepine and opioid?  Yes, I feel it is clincially indicated to continue the medication and have discussed with the patient risks/benefits/alternatives.    Last Urine Drug Screen / ordered today: No    Controlled Substance Agreement:  Date of the Last Agreement: 02/02/2024  I attest that the agreement was reviewed and the patient is using the drug as prescribed.    Benzodiazepines:  What is the patient's goal of therapy? Control over pain attacks  Is this being achieved with current treatment? yes    Activities of Daily Living:   Is your overall impression that this patient is benefiting (symptom reduction outweighs side effects) from benzodiazepine therapy? Yes     1. Physical Functioning:  Better  2. Family Relationship: Better  3. Social Relationship: Better  4. Mood: Better  5. Sleep Patterns: Better  6. Overall Function: Better    Objective   Mental Status Exam:  General Appearance: Well groomed, appropriate eye contact  Attitude/Behavior: Cooperative  Motor: No psychomotor agitation or retardation, no tremor or other abnormal movements  Speech: Normal rate, volume, prosody  Gait/Station: WFL - Within functional limits  Mood: 'good!'  Affect: Euthymic, full-range  Thought Process: Linear, goal directed  Thought Associations: No loosening of associations  Thought Content: Normal  Perception: No perceptual abnormalities noted  Sensorium: Alert and oriented to person, place, time and situation  Insight: Intact  Judgement: Intact  Cognition: Cognitively intact to conversational testing with respect to attention, orientation, fund of knowledge, recent and remote memory, and language  Testing: N/A    JAMISON-7/PHQ-9 scores reviewed: 0, 0 compared to 8, 15 reflecting complete remission of both anxiety and mood/depression.    Current Medications:  Current Outpatient Medications on File Prior to Visit   Medication Sig Dispense Refill    ALPRAZolam (Xanax) 1 mg tablet Take 1 tablet (1 mg) by mouth once daily as needed for anxiety.      buPROPion XL (Wellbutrin XL) 300 mg 24 hr tablet TAKE 1 TABLET BY MOUTH EVERY DAY 90 tablet 0    escitalopram (Lexapro) 20 mg tablet Take 1 tablet (20 mg) by mouth once daily. 90 tablet 3    escitalopram (Lexapro) 5 mg tablet TAKE 1 TABLET BY MOUTH EVERY DAY 90 tablet 0    calcium carbonate-vitamin D3 500 mg-5 mcg (200 unit) tablet Take 1 tablet by mouth once daily.      esomeprazole (NexIUM) 40 mg packet Take 20 mg by mouth once daily in the morning. Take before meals.      magnesium glycinate 100 mg magnesium capsule Take 1 capsule (100 mg) by mouth once daily.       No current facility-administered medications on file prior to visit.       Lab Review:   not applicable      Time  Spent:    Prep time: 1 min.  Direct patient time: 26 min.  Documentation time: 6 min.   Total time: 33 min.    Next Appointment:  Follow up in about 2 months (around 4/2/2024).

## 2024-02-03 PROBLEM — F41.8 MIXED ANXIETY DEPRESSIVE DISORDER: Status: RESOLVED | Noted: 2018-06-20 | Resolved: 2024-02-03

## 2024-02-03 ASSESSMENT — ENCOUNTER SYMPTOMS: MUSCULOSKELETAL NEGATIVE: 1

## 2024-02-05 DIAGNOSIS — E78.5 HYPERLIPIDEMIA, UNSPECIFIED HYPERLIPIDEMIA TYPE: Primary | ICD-10-CM

## 2024-04-04 ENCOUNTER — TELEMEDICINE (OUTPATIENT)
Dept: BEHAVIORAL HEALTH | Facility: CLINIC | Age: 44
End: 2024-04-04
Payer: COMMERCIAL

## 2024-04-04 DIAGNOSIS — F41.9 ANXIETY: ICD-10-CM

## 2024-04-04 DIAGNOSIS — F42.8 OBSESSIVE THINKING: ICD-10-CM

## 2024-04-04 DIAGNOSIS — F33.8 SEASONAL AFFECTIVE DISORDER (CMS-HCC): ICD-10-CM

## 2024-04-04 DIAGNOSIS — F32.5 MAJOR DEPRESSION IN REMISSION (CMS-HCC): ICD-10-CM

## 2024-04-04 PROCEDURE — 3008F BODY MASS INDEX DOCD: CPT | Performed by: NURSE PRACTITIONER

## 2024-04-04 PROCEDURE — 99214 OFFICE O/P EST MOD 30 MIN: CPT | Performed by: NURSE PRACTITIONER

## 2024-04-04 ASSESSMENT — PATIENT HEALTH QUESTIONNAIRE - PHQ9
7. TROUBLE CONCENTRATING ON THINGS, SUCH AS READING THE NEWSPAPER OR WATCHING TELEVISION: NOT AT ALL
3. TROUBLE FALLING OR STAYING ASLEEP OR SLEEPING TOO MUCH: NOT AT ALL
9. THOUGHTS THAT YOU WOULD BE BETTER OFF DEAD, OR OF HURTING YOURSELF: NOT AT ALL
10. IF YOU CHECKED OFF ANY PROBLEMS, HOW DIFFICULT HAVE THESE PROBLEMS MADE IT FOR YOU TO DO YOUR WORK, TAKE CARE OF THINGS AT HOME, OR GET ALONG WITH OTHER PEOPLE: NOT DIFFICULT AT ALL
1. LITTLE INTEREST OR PLEASURE IN DOING THINGS: NOT AT ALL
5. POOR APPETITE OR OVEREATING: NOT AT ALL
2. FEELING DOWN, DEPRESSED OR HOPELESS: NOT AT ALL
8. MOVING OR SPEAKING SO SLOWLY THAT OTHER PEOPLE COULD HAVE NOTICED. OR THE OPPOSITE, BEING SO FIGETY OR RESTLESS THAT YOU HAVE BEEN MOVING AROUND A LOT MORE THAN USUAL: NOT AT ALL
4. FEELING TIRED OR HAVING LITTLE ENERGY: NOT AT ALL
6. FEELING BAD ABOUT YOURSELF - OR THAT YOU ARE A FAILURE OR HAVE LET YOURSELF OR YOUR FAMILY DOWN: NOT AT ALL

## 2024-04-04 ASSESSMENT — ANXIETY QUESTIONNAIRES
3. WORRYING TOO MUCH ABOUT DIFFERENT THINGS: NOT AT ALL
IF YOU CHECKED OFF ANY PROBLEMS ON THIS QUESTIONNAIRE, HOW DIFFICULT HAVE THESE PROBLEMS MADE IT FOR YOU TO DO YOUR WORK, TAKE CARE OF THINGS AT HOME, OR GET ALONG WITH OTHER PEOPLE: NOT DIFFICULT AT ALL
2. NOT BEING ABLE TO STOP OR CONTROL WORRYING: NOT AT ALL
1. FEELING NERVOUS, ANXIOUS, OR ON EDGE: NOT AT ALL
4. TROUBLE RELAXING: NOT AT ALL
GAD7 TOTAL SCORE: 0
6. BECOMING EASILY ANNOYED OR IRRITABLE: NOT AT ALL
5. BEING SO RESTLESS THAT IT IS HARD TO SIT STILL: NOT AT ALL
7. FEELING AFRAID AS IF SOMETHING AWFUL MIGHT HAPPEN: NOT AT ALL

## 2024-04-04 ASSESSMENT — ENCOUNTER SYMPTOMS
MUSCULOSKELETAL NEGATIVE: 1
GASTROINTESTINAL NEGATIVE: 1
RESPIRATORY NEGATIVE: 1
CARDIOVASCULAR NEGATIVE: 1

## 2024-04-04 NOTE — PROGRESS NOTES
"Adult Ambulatory Psychiatry Progress Note      Assessment/Plan     Impression:  Boo De La O is a 43 y.o. male domiciled , employed as manager at [employer], who presents for follow up with CC of \"Things are good. Wife is in [employer] for work. Kids are at the [employer] for spring break and last night I had the house to myself and it was a calm, easy night. It was eerily quiet so I was not used to that. I was nice and chill and was in bed by 8pm.\"    Plan: Reviewed and agreed that as he reports and presents himself to be stable, no changes to medications and treatment plan are needed. Continues to see his , and couple's therapy with his wife.    Medication: Continues taking Lexapro 25mg every day, Wellbutrin XL 300mg every day, Xanax 1mg PRN (no refills).    Diagnoses and all orders for this visit:  Anxiety  Seasonal affective disorder (CMS/HCC)  Obsessive thinking  Major depression in remission (CMS/HCC)      Therapy: none    Other: n/a    Subjective   HPI:  \"Both the patient, and family and caregivers and guardians as appropriate, were informed of the current need to conduct treatment via telephone. I have confirmed the patient's identity via the following (minimum of three) acceptable identifiers as per  Policy PH-9: home address, , S.S.\"     Present Illness - anxiety     Recent psychiatric symptoms (pertinent positives and negatives) - reports he and his wife are still working on their relationship and reports they use a joint journal to express their thoughts/feelings with one another, as part of their couple's counseling and it can help but also it can be frustrating and feels that it is not worth lingering on her stuff. Admits that her francesco-menopausal symptoms are starting to become less a part of her mood swings which he finds is a relief and that her GI doctor has her on Buspar for her IBS symptoms. Reports overall anxiety and depression remain 'both really stable.' However can experience " situational moments of sadness 'that are Becka-centric. A classic bad day where one is not jovial, and just feeling sucky. But not feeling numb or brain fog. It's just like only happened two days out of the past month honestly that I felt that way.' Feels just by walking can help pull him out of the funk. Reports appetite has been 'good.' I am still trying to cut down on the processed foods to reduce the waist line as I refuse to buy new pants size. I did torque my back a few weeks ago so that kept me out of the gym for awhile there, and I am back to the gym now, doing karate and other exercises and that helps me focus on my diet when exercising again.' Reports sleep has been 'a little problematic with me falling asleep. And sleep inertia with waking up in the morning is not the best, and I feel somewhat cranky, but in general my sleep is fine. I am in bed for 8 hours but if I do have trouble falling asleep I know I am cutting into my time. I can feel the cognitive shift through the day. Reports energy levels have been 'fine' and may experience some mental fatigue by the end of the work day, denying physical fatigue. Denies obsessive, ruminating, intrusive and racing thoughts. Does find a  to be helpful with working on seeing things through different perspectives.     Onset/timeframe - 6 weeks  Type - anxiety  Duration - situational  Aggravating and/or relieving factors/triggers - only has anxiety when dealing with his wife's mood swings but continues to use skills learned through sessions with  with managing his responses to her, in order to control his anxiety.   Treatment and treatment changes (new meds, dosage increases or decreases, med compliance, therapy frequency, etc.) (Past and Recent) - Lexapro 25mg QD, Wellbutrin XL 300mg QD, Xanax 1mg PRN (no refills). Doing  sessions every 1-2 weeks, Edgar Lomas. Marriage counselor encouraged patient to do  to do middle ground  therapy for his wife, while his wife continues her own therapy, and search for a new couples therapist.      Issues: Denies SI/HI/AVH, currently.           Review of Systems   Respiratory: Negative.     Cardiovascular: Negative.    Gastrointestinal: Negative.    Genitourinary: Negative.    Musculoskeletal: Negative.        OARRS: Reviewed OARRS on 04/06/2024 by Anurag Altman, APRN-CNP -OARRS has been reviewed and is consistent with prescribed medications, Considered the risks of abuse, dependence, addiction and diversion, Medication is felt to be clinically appropriate based on documented diagnosis    I have personally reviewed the OARRS report for Boo De La O. I have considered the risks of abuse, dependence, addiction and diversion    Is the patient prescribed a combination of a benzodiazepine and opioid?  Yes, I feel it is clincially indicated to continue the medication and have discussed with the patient risks/benefits/alternatives.    Last Urine Drug Screen / ordered today: No    Controlled Substance Agreement:  Date of the Last Agreement: 02/02/2024  I attest that the agreement was reviewed and the patient is using the drug as prescribed.    Benzodiazepines:  What is the patient's goal of therapy? Control over pain attacks  Is this being achieved with current treatment? yes    Activities of Daily Living:   Is your overall impression that this patient is benefiting (symptom reduction outweighs side effects) from benzodiazepine therapy? Yes     1. Physical Functioning: Better  2. Family Relationship: Better  3. Social Relationship: Better  4. Mood: Better  5. Sleep Patterns: Better  6. Overall Function: Better    Objective   Mental Status Exam:  General Appearance: Well groomed, appropriate eye contact  Attitude/Behavior: Cooperative  Motor: No psychomotor agitation or retardation, no tremor or other abnormal movements  Speech: Normal rate, volume, prosody  Gait/Station: Other:(comment) (sitting in car at  work, over virtual connection)  Mood: 'fine'  Affect: Euthymic, full-range, Congruent with mood and topic of conversation  Thought Process: Linear, goal directed  Thought Associations: No loosening of associations  Thought Content: Normal, Other: (comment) (learning to just not give into wife's outrageous behaviors and let her work on herself and not be bullied by her)  Perception: No perceptual abnormalities noted  Sensorium: Alert and oriented to person, place, time and situation  Insight: Intact  Judgement: Intact  Cognition: Cognitively intact to conversational testing with respect to attention, orientation, fund of knowledge, recent and remote memory, and language  Testing: N/A    JAMISON-7/PHQ-9 scores reviewed: 0, 0 compared to 8, 15 reflecting complete remission of both anxiety and mood/depression.    Current Medications:  Current Outpatient Medications on File Prior to Visit   Medication Sig Dispense Refill    ALPRAZolam (Xanax) 1 mg tablet Take 1 tablet (1 mg) by mouth once daily as needed for anxiety.      buPROPion XL (Wellbutrin XL) 300 mg 24 hr tablet TAKE 1 TABLET BY MOUTH EVERY DAY 90 tablet 0    calcium carbonate-vitamin D3 500 mg-5 mcg (200 unit) tablet Take 1 tablet by mouth once daily.      escitalopram (Lexapro) 20 mg tablet Take 1 tablet (20 mg) by mouth once daily. 90 tablet 3    escitalopram (Lexapro) 5 mg tablet TAKE 1 TABLET BY MOUTH EVERY DAY 90 tablet 0    esomeprazole (NexIUM) 40 mg packet Take 20 mg by mouth once daily in the morning. Take before meals.      magnesium glycinate 100 mg magnesium capsule Take 1 capsule (100 mg) by mouth once daily.       No current facility-administered medications on file prior to visit.       Lab Review:   not applicable      Time Spent:    Prep time: 1 min.  Direct patient time: 27 min.  Documentation time: 5 min.   Total time: 33 min.    Next Appointment:  Follow up in about 2 months (around 6/4/2024).

## 2024-04-05 ENCOUNTER — APPOINTMENT (OUTPATIENT)
Dept: BEHAVIORAL HEALTH | Facility: CLINIC | Age: 44
End: 2024-04-05
Payer: COMMERCIAL

## 2024-06-04 ENCOUNTER — TELEMEDICINE (OUTPATIENT)
Dept: BEHAVIORAL HEALTH | Facility: CLINIC | Age: 44
End: 2024-06-04
Payer: COMMERCIAL

## 2024-06-04 DIAGNOSIS — F42.8 OBSESSIVE THINKING: ICD-10-CM

## 2024-06-04 DIAGNOSIS — F32.5 MAJOR DEPRESSION IN REMISSION (CMS-HCC): Primary | ICD-10-CM

## 2024-06-04 DIAGNOSIS — F32.A DEPRESSION, UNSPECIFIED: ICD-10-CM

## 2024-06-04 DIAGNOSIS — F41.9 ANXIETY DISORDER, UNSPECIFIED: ICD-10-CM

## 2024-06-04 DIAGNOSIS — F41.9 ANXIETY: ICD-10-CM

## 2024-06-04 DIAGNOSIS — F32.1 CURRENT MODERATE EPISODE OF MAJOR DEPRESSIVE DISORDER, UNSPECIFIED WHETHER RECURRENT (MULTI): ICD-10-CM

## 2024-06-04 DIAGNOSIS — F33.8 SEASONAL AFFECTIVE DISORDER (CMS-HCC): ICD-10-CM

## 2024-06-04 PROCEDURE — 1036F TOBACCO NON-USER: CPT | Performed by: NURSE PRACTITIONER

## 2024-06-04 PROCEDURE — 99214 OFFICE O/P EST MOD 30 MIN: CPT | Performed by: NURSE PRACTITIONER

## 2024-06-04 PROCEDURE — 3008F BODY MASS INDEX DOCD: CPT | Performed by: NURSE PRACTITIONER

## 2024-06-04 RX ORDER — ESCITALOPRAM OXALATE 5 MG/1
5 TABLET ORAL DAILY
Qty: 90 TABLET | Refills: 3 | Status: SHIPPED | OUTPATIENT
Start: 2024-06-04 | End: 2025-06-04

## 2024-06-04 RX ORDER — BUPROPION HYDROCHLORIDE 300 MG/1
300 TABLET ORAL DAILY
Qty: 90 TABLET | Refills: 3 | Status: SHIPPED | OUTPATIENT
Start: 2024-06-04 | End: 2025-06-04

## 2024-06-04 ASSESSMENT — PATIENT HEALTH QUESTIONNAIRE - PHQ9
10. IF YOU CHECKED OFF ANY PROBLEMS, HOW DIFFICULT HAVE THESE PROBLEMS MADE IT FOR YOU TO DO YOUR WORK, TAKE CARE OF THINGS AT HOME, OR GET ALONG WITH OTHER PEOPLE: NOT DIFFICULT AT ALL
4. FEELING TIRED OR HAVING LITTLE ENERGY: NOT AT ALL
8. MOVING OR SPEAKING SO SLOWLY THAT OTHER PEOPLE COULD HAVE NOTICED. OR THE OPPOSITE, BEING SO FIGETY OR RESTLESS THAT YOU HAVE BEEN MOVING AROUND A LOT MORE THAN USUAL: NOT AT ALL
5. POOR APPETITE OR OVEREATING: NOT AT ALL
1. LITTLE INTEREST OR PLEASURE IN DOING THINGS: NOT AT ALL
3. TROUBLE FALLING OR STAYING ASLEEP OR SLEEPING TOO MUCH: NOT AT ALL
9. THOUGHTS THAT YOU WOULD BE BETTER OFF DEAD, OR OF HURTING YOURSELF: NOT AT ALL
7. TROUBLE CONCENTRATING ON THINGS, SUCH AS READING THE NEWSPAPER OR WATCHING TELEVISION: NOT AT ALL
2. FEELING DOWN, DEPRESSED OR HOPELESS: NOT AT ALL
6. FEELING BAD ABOUT YOURSELF - OR THAT YOU ARE A FAILURE OR HAVE LET YOURSELF OR YOUR FAMILY DOWN: NOT AT ALL

## 2024-06-04 ASSESSMENT — ENCOUNTER SYMPTOMS
PSYCHIATRIC NEGATIVE: 1
CONSTITUTIONAL NEGATIVE: 1

## 2024-06-04 ASSESSMENT — ANXIETY QUESTIONNAIRES
4. TROUBLE RELAXING: NOT AT ALL
7. FEELING AFRAID AS IF SOMETHING AWFUL MIGHT HAPPEN: NOT AT ALL
5. BEING SO RESTLESS THAT IT IS HARD TO SIT STILL: NOT AT ALL
1. FEELING NERVOUS, ANXIOUS, OR ON EDGE: NOT AT ALL
IF YOU CHECKED OFF ANY PROBLEMS ON THIS QUESTIONNAIRE, HOW DIFFICULT HAVE THESE PROBLEMS MADE IT FOR YOU TO DO YOUR WORK, TAKE CARE OF THINGS AT HOME, OR GET ALONG WITH OTHER PEOPLE: NOT DIFFICULT AT ALL
2. NOT BEING ABLE TO STOP OR CONTROL WORRYING: NOT AT ALL
3. WORRYING TOO MUCH ABOUT DIFFERENT THINGS: NOT AT ALL
GAD7 TOTAL SCORE: 0
6. BECOMING EASILY ANNOYED OR IRRITABLE: NOT AT ALL

## 2024-06-04 NOTE — PROGRESS NOTES
"Adult Ambulatory Psychiatry Progress Note      Assessment/Plan     Impression:  Boo De La O is a 43 y.o. male domiciled , employed as manager at [employer], who presents for follow up with CC of \"Things have been great.\"    Plan: Patient was engaging, cooperative and calm. Reviewed and agreed that as he reports and presents himself to be stables still, no changes to medications and treatment plan are needed. Continues to see his , and couple's therapy with his wife. Agreed to pushing out followup appt to 3 months.    Medication: Continues taking Lexapro 25mg every day, Wellbutrin XL 300mg every day, Xanax 1mg PRN (no refills).    Diagnoses and all orders for this visit:  Major depression in remission (CMS-HCC)  -     escitalopram (Lexapro) 5 mg tablet; Take 1 tablet (5 mg) by mouth once daily.  -     buPROPion XL (Wellbutrin XL) 300 mg 24 hr tablet; Take 1 tablet (300 mg) by mouth once daily.  Anxiety disorder, unspecified  -     escitalopram (Lexapro) 5 mg tablet; Take 1 tablet (5 mg) by mouth once daily.  Depression, unspecified  Seasonal affective disorder (CMS-HCC)  Anxiety  Current moderate episode of major depressive disorder, unspecified whether recurrent (Multi)  Obsessive thinking        Therapy: none    Other: n/a    Subjective   HPI:  \"Both the patient, and family and caregivers and guardians as appropriate, were informed of the current need to conduct treatment via telephone. I have confirmed the patient's identity via the following (minimum of three) acceptable identifiers as per  Policy PH-9: home address, , S.S.\"     Virtual or Telephone Consent    An interactive audio and video telecommunication system which permits real time communications between the patient (at the originating site) and provider (at the distant site) was utilized to provide this telehealth service.   Verbal consent was requested and obtained from Boo De La O on this date, 24 for a telehealth visit. "     Present Illness - anxiety     Recent psychiatric symptoms (pertinent positives and negatives) - reports overall anxiety and depression are stable. Work stress 'is just work stress. 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 Reports he and his wife are still doing their therapy and making sure to work on their relationship but makes sure to maintain healthy boundaries for himself and not let his wife's issues impact him personally. Reports he is making sure to keep things motivated going forward for their relationship and let her work on her francesco-menopausal symptoms before re-engaging her to do more couple's therapy together. He sees his own  who guides him with suggestions of patience, forgiveness and understanding, with his wife and try and be compassionate with putting himself in his wife's shoes. Any feeling down and in a funk 'are very short lived, just a few minutes and only its after Becka comes home from a trip and in a foul mood, which puts everyone in a mood. I don't let myself linger in them anymore.' Reports appetite has been 'good and now I am down 10 lbs. I am more outdoors and active so that helps. I am firing up the grill more and eating chicken more, so it is a lifestyle shift.' Still does gym in the AM, and karate at night, especially with his one daughter Jaylene. Reports sleep has been still an issue for him with falling asleep, with 'I am still wide awake at 9-10 at night, looking at the clock even though I am in bed by 8 and I would like to be asleep and get 8-8.5 hours, but as I  "am getting up at 5 and wide awake and not cranky, I'll take it.' Does have his AM coffee everyday along with Ibuprofen and 'it's one of my little vices I can live with.' Reports energy levels have been 'great' and denies physical/mental fatigue. Denies obsessive, ruminating, intrusive and racing thoughts. \"I am at a really good place in my life, where I am noticing how relaxed and calm my mind and body is right now. I am keeping a close on that feeling of heightened energy that can happen when it is summer time, but I am realizing it is not happening right now.\"     Onset/timeframe - 2 months  Type - anxiety  Duration - situational  Aggravating and/or relieving factors/triggers - only has anxiety when dealing with his wife's mood swings but continues to use skills learned through sessions with  with managing his responses to her, and any work stressors he deals with, he learns to accept them as being outside of his control.  Treatment and treatment changes (new meds, dosage increases or decreases, med compliance, therapy frequency, etc.) (Past and Recent) - Lexapro 25mg QD, Wellbutrin XL 300mg QD, Xanax 1mg PRN (no refills). Doing  sessions every 1-2 weeks, Edgar Lomas. Marriage counselor encouraged patient to do  to do middle ground therapy for his wife, while his wife continues her own therapy, and search for a new couples therapist.      Issues: Denies SI/HI/AVH, currently.           Review of Systems   Constitutional: Negative.    HENT: Negative.     Psychiatric/Behavioral: Negative.         OARRS: Reviewed OARRS on 04/06/2024 by YOUSIF Ford-CNP -OARRS has been reviewed and is consistent with prescribed medications, Considered the risks of abuse, dependence, addiction and diversion, Medication is felt to be clinically appropriate based on documented diagnosis    I have personally reviewed the OARRS report for Boo De La O. I have considered the risks of abuse, dependence, " addiction and diversion    Is the patient prescribed a combination of a benzodiazepine and opioid?  Yes, I feel it is clincially indicated to continue the medication and have discussed with the patient risks/benefits/alternatives.    Last Urine Drug Screen / ordered today: No    Controlled Substance Agreement:  Date of the Last Agreement: 02/02/2024  I attest that the agreement was reviewed and the patient is using the drug as prescribed.    Benzodiazepines:  What is the patient's goal of therapy? Control over pain attacks  Is this being achieved with current treatment? yes    Activities of Daily Living:   Is your overall impression that this patient is benefiting (symptom reduction outweighs side effects) from benzodiazepine therapy? Yes     1. Physical Functioning: Better  2. Family Relationship: Better  3. Social Relationship: Better  4. Mood: Better  5. Sleep Patterns: Better  6. Overall Function: Better    Objective   Mental Status Exam:  General Appearance: Well groomed, appropriate eye contact  Attitude/Behavior: Cooperative  Motor: No psychomotor agitation or retardation, no tremor or other abnormal movements  Speech: Normal rate, volume, prosody  Gait/Station: Other:(comment) (sitting in parked car at work, over virtual connection)  Mood: 'great'  Affect: Euthymic, full-range  Thought Process: Linear, goal directed  Thought Associations: No loosening of associations  Thought Content: Normal  Perception: No perceptual abnormalities noted  Sensorium: Alert and oriented to person, place, time and situation  Insight: Intact  Judgement: Intact  Cognition: Cognitively intact to conversational testing with respect to attention, orientation, fund of knowledge, recent and remote memory, and language  Testing: N/A    JAMISON-7/PHQ-9 scores reviewed: 0, 0 compared to 0, 0 reflecting complete remission of both anxiety and mood/depression.    Current Medications:  Current Outpatient Medications on File Prior to Visit    Medication Sig Dispense Refill    ALPRAZolam (Xanax) 1 mg tablet Take 1 tablet (1 mg) by mouth once daily as needed for anxiety.      calcium carbonate-vitamin D3 500 mg-5 mcg (200 unit) tablet Take 1 tablet by mouth once daily.      escitalopram (Lexapro) 20 mg tablet Take 1 tablet (20 mg) by mouth once daily. 90 tablet 3    esomeprazole (NexIUM) 40 mg packet Take 20 mg by mouth once daily in the morning. Take before meals.      magnesium glycinate 100 mg magnesium capsule Take 1 capsule (100 mg) by mouth once daily.      [DISCONTINUED] buPROPion XL (Wellbutrin XL) 300 mg 24 hr tablet TAKE 1 TABLET BY MOUTH EVERY DAY 90 tablet 0    [DISCONTINUED] escitalopram (Lexapro) 5 mg tablet TAKE 1 TABLET BY MOUTH EVERY DAY 90 tablet 0     No current facility-administered medications on file prior to visit.       Lab Review:   not applicable      Time Spent:    Prep time: 1 min.  Direct patient time: 25 min.  Documentation time: 4 min.   Total time: 30 min.    Next Appointment:  Follow up in about 3 months (around 9/4/2024).

## 2024-09-05 ENCOUNTER — APPOINTMENT (OUTPATIENT)
Dept: BEHAVIORAL HEALTH | Facility: CLINIC | Age: 44
End: 2024-09-05
Payer: COMMERCIAL

## 2024-09-05 DIAGNOSIS — F42.8 OBSESSIVE THINKING: ICD-10-CM

## 2024-09-05 DIAGNOSIS — F41.1 GAD (GENERALIZED ANXIETY DISORDER): Primary | ICD-10-CM

## 2024-09-05 DIAGNOSIS — F33.8 SEASONAL AFFECTIVE DISORDER (CMS-HCC): ICD-10-CM

## 2024-09-05 DIAGNOSIS — F32.5 MAJOR DEPRESSION IN REMISSION (CMS-HCC): ICD-10-CM

## 2024-09-05 PROCEDURE — 99214 OFFICE O/P EST MOD 30 MIN: CPT | Performed by: NURSE PRACTITIONER

## 2024-09-05 ASSESSMENT — PATIENT HEALTH QUESTIONNAIRE - PHQ9
9. THOUGHTS THAT YOU WOULD BE BETTER OFF DEAD, OR OF HURTING YOURSELF: NOT AT ALL
8. MOVING OR SPEAKING SO SLOWLY THAT OTHER PEOPLE COULD HAVE NOTICED. OR THE OPPOSITE, BEING SO FIGETY OR RESTLESS THAT YOU HAVE BEEN MOVING AROUND A LOT MORE THAN USUAL: NOT AT ALL
5. POOR APPETITE OR OVEREATING: NOT AT ALL
7. TROUBLE CONCENTRATING ON THINGS, SUCH AS READING THE NEWSPAPER OR WATCHING TELEVISION: NOT AT ALL
3. TROUBLE FALLING OR STAYING ASLEEP OR SLEEPING TOO MUCH: NOT AT ALL
4. FEELING TIRED OR HAVING LITTLE ENERGY: NOT AT ALL
6. FEELING BAD ABOUT YOURSELF - OR THAT YOU ARE A FAILURE OR HAVE LET YOURSELF OR YOUR FAMILY DOWN: NOT AT ALL
2. FEELING DOWN, DEPRESSED OR HOPELESS: NOT AT ALL
10. IF YOU CHECKED OFF ANY PROBLEMS, HOW DIFFICULT HAVE THESE PROBLEMS MADE IT FOR YOU TO DO YOUR WORK, TAKE CARE OF THINGS AT HOME, OR GET ALONG WITH OTHER PEOPLE: NOT DIFFICULT AT ALL
1. LITTLE INTEREST OR PLEASURE IN DOING THINGS: NOT AT ALL

## 2024-09-05 ASSESSMENT — ANXIETY QUESTIONNAIRES
3. WORRYING TOO MUCH ABOUT DIFFERENT THINGS: NOT AT ALL
5. BEING SO RESTLESS THAT IT IS HARD TO SIT STILL: NOT AT ALL
GAD7 TOTAL SCORE: 1
2. NOT BEING ABLE TO STOP OR CONTROL WORRYING: NOT AT ALL
6. BECOMING EASILY ANNOYED OR IRRITABLE: SEVERAL DAYS
IF YOU CHECKED OFF ANY PROBLEMS ON THIS QUESTIONNAIRE, HOW DIFFICULT HAVE THESE PROBLEMS MADE IT FOR YOU TO DO YOUR WORK, TAKE CARE OF THINGS AT HOME, OR GET ALONG WITH OTHER PEOPLE: NOT DIFFICULT AT ALL
7. FEELING AFRAID AS IF SOMETHING AWFUL MIGHT HAPPEN: NOT AT ALL
1. FEELING NERVOUS, ANXIOUS, OR ON EDGE: NOT AT ALL
4. TROUBLE RELAXING: NOT AT ALL

## 2024-09-05 ASSESSMENT — ENCOUNTER SYMPTOMS: PSYCHIATRIC NEGATIVE: 1

## 2024-09-05 NOTE — PROGRESS NOTES
"Adult Ambulatory Psychiatry Progress Note      Assessment/Plan     Impression:  Boo De La O is a 43 y.o. male domiciled , employed as manager at [employer], who presents for follow up with CC of \"Things are good.\"    Plan: Patient was engaging, cooperative and calm. Reviewed and agreed that as he reports and presents himself to be stables still, no changes to medications and treatment plan are needed.     Medication: Lexapro 25mg every day, Wellbutrin XL 300mg every day, Xanax 1mg PRN (no refills).    Diagnoses and all orders for this visit:  JAMISON (generalized anxiety disorder)  Obsessive thinking  Major depression in remission (CMS-HCC)  Seasonal affective disorder (CMS-HCC)          Therapy: none    Other: n/a    Subjective   HPI:  \"Both the patient, and family and caregivers and guardians as appropriate, were informed of the current need to conduct treatment via telephone. I have confirmed the patient's identity via the following (minimum of three) acceptable identifiers as per  Policy PH-9: home address, , S.S.\"     Virtual or Telephone Consent    An interactive audio and video telecommunication system which permits real time communications between the patient (at the originating site) and provider (at the distant site) was utilized to provide this telehealth service.   Verbal consent was requested and obtained from Boo De La O on this date, 24 for a telehealth visit.     Present Illness - anxiety     Recent psychiatric symptoms (pertinent positives and negatives) - reports having had a good summer with his family once they were on summer break - went to [place] with his wife and girls and then [facility] in [place], [place]. Reports his wife is up and down and has her 'days of balls of negativity and then happy and going well and then just days of neutrality. She is far better than she was a year ago with her hormonal stuff but...' Reports frustration with how their communication is not the best " as she will stonewall when they may have arguments so he just lets it go and she can have all the negativity she wants and walks away from it all. Admits that 90% of his life is 'great and fine.' Loves the time with his kids, his work, and has his wife, but the other 10% is lacking having an intimate partner aspect and that Becka (his wife) is very guarded and not letting him be a true partner/spouse so he feels that their relationship is stagnant. Wants to see his wife get past through her messiness, her mental health issues and not 'blow up the 90% that is going well, just for the sake of the 10% that is not going well.' Reports work stress is 'non-existent.' Admits that management realizes how important of a role he is and has allowed him to move to a new location that affords him to be more productive. Reports his wife is graduating from a [employer] program soon, and will be driving with her to [place] soon to celebrate her graduating. Reports both anxiety and depression are stable but over the summer, d/t heat and humidity which are the negatives for him (seasonal depression), while still can impact him haven't been as bad as usual. Admits noticing how the different medications are impacting him differently with sexual side effects and appetite - Lexapro, and Wellbutrin. Admits to falling asleep has not been great lately, sometimes taking up to 1 hour to fall asleep d/t racing thoughts, 'but I am not worried about it' and still achieves 8 hours of sleep at night. As he continues to work out which naturally exhausts him, he does fall asleep and stay asleep more easily. Reports appetite is 'good'. Denies racing, obsessive, ruminating or intrusive thoughts. Energy levels and mental/physical fatigue are fine.     Onset/timeframe - 3 months  Type - anxiety  Duration - situational  Aggravating and/or relieving factors/triggers - only has anxiety when dealing with his wife's mood swings and behaviors but continues to use  skills learned through sessions with  with managing his responses to her.  Treatment and treatment changes (new meds, dosage increases or decreases, med compliance, therapy frequency, etc.) (Past and Recent) - Lexapro 25mg QD, Wellbutrin XL 300mg QD, Xanax 1mg PRN (no refills). Doing  sessions every 1-2 weeks, Edgar Lomas. Marriage counselor encouraged patient to do  to do middle ground therapy for his wife, while his wife continues her own therapy, and search for a new couples therapist.      Issues: Denies SI/HI/AVH, currently.           Review of Systems   Psychiatric/Behavioral: Negative.     All other systems reviewed and are negative.      OARRS: Reviewed OARRS on 09/08/2024 by KATHY Ford -OARRS has been reviewed and is consistent with prescribed medications, Considered the risks of abuse, dependence, addiction and diversion, Medication is felt to be clinically appropriate based on documented diagnosis    I have personally reviewed the OARRS report for [place]. I have considered the risks of abuse, dependence, addiction and diversion    Is the patient prescribed a combination of a benzodiazepine and opioid?  Yes, I feel it is clincially indicated to continue the medication and have discussed with the patient risks/benefits/alternatives.    Last Urine Drug Screen / ordered today: No    Controlled Substance Agreement:  Date of the Last Agreement: 02/02/2024  I attest that the agreement was reviewed and the patient is using the drug as prescribed.    Benzodiazepines:  What is the patient's goal of therapy? Control over pain attacks  Is this being achieved with current treatment? yes    Activities of Daily Living:   Is your overall impression that this patient is benefiting (symptom reduction outweighs side effects) from benzodiazepine therapy? Yes     1. Physical Functioning: Better  2. Family Relationship: Better  3. Social Relationship: Better  4. Mood:  Better  5. Sleep Patterns: Better  6. Overall Function: Better    Objective   Mental Status Exam:  General Appearance: Well groomed, appropriate eye contact  Motor: No psychomotor agitation or retardation, no tremor or other abnormal movements  Speech: Normal rate, volume, prosody  Gait/Station: Other:(comment) (sitting in office at work, over virtual connection)  Mood: 'fine'  Affect: Euthymic, full-range, Congruent with mood and topic of conversation  Thought Process: Linear, goal directed  Thought Associations: No loosening of associations  Thought Content: Normal, Other: (comment) (still has frustration with wife's behaviors and mental health not being well-managed but works on letting go, his responses to them and focuses on staying positive.)  Perception: No perceptual abnormalities noted  Sensorium: Alert and oriented to person, place, time and situation  Insight: Intact  Judgement: Intact  Cognition: Cognitively intact to conversational testing with respect to attention, orientation, fund of knowledge, recent and remote memory, and language  Testing: N/A    JAMISON-7/PHQ-9 scores reviewed: 1, 0 compared to 0, 0 reflecting complete remission of both anxiety and mood/depression.    Current Medications:  Current Outpatient Medications on File Prior to Visit   Medication Sig Dispense Refill    ALPRAZolam (Xanax) 1 mg tablet Take 1 tablet (1 mg) by mouth once daily as needed for anxiety.      buPROPion XL (Wellbutrin XL) 300 mg 24 hr tablet Take 1 tablet (300 mg) by mouth once daily. 90 tablet 3    calcium carbonate-vitamin D3 500 mg-5 mcg (200 unit) tablet Take 1 tablet by mouth once daily.      escitalopram (Lexapro) 20 mg tablet Take 1 tablet (20 mg) by mouth once daily. 90 tablet 3    escitalopram (Lexapro) 5 mg tablet Take 1 tablet (5 mg) by mouth once daily. 90 tablet 3    esomeprazole (NexIUM) 40 mg packet Take 20 mg by mouth once daily in the morning. Take before meals.      magnesium glycinate 100 mg  magnesium capsule Take 1 capsule (100 mg) by mouth once daily.       No current facility-administered medications on file prior to visit.       Lab Review:   not applicable      Time Spent:    Prep time: 1 min.  Direct patient time: 25 min.  Documentation time: 4 min.   Total time: 30 min.    Next Appointment:  Follow up in about 3 months (around 12/5/2024).

## 2024-09-08 PROBLEM — F41.1 GAD (GENERALIZED ANXIETY DISORDER): Status: ACTIVE | Noted: 2024-09-08

## 2024-09-08 PROBLEM — F41.9 ANXIETY: Status: RESOLVED | Noted: 2023-09-15 | Resolved: 2024-09-08

## 2024-09-13 DIAGNOSIS — F32.1 CURRENT MODERATE EPISODE OF MAJOR DEPRESSIVE DISORDER, UNSPECIFIED WHETHER RECURRENT (MULTI): ICD-10-CM

## 2024-09-13 DIAGNOSIS — F41.9 ANXIETY: ICD-10-CM

## 2024-09-13 DIAGNOSIS — F33.8 SEASONAL AFFECTIVE DISORDER (CMS-HCC): ICD-10-CM

## 2024-09-13 RX ORDER — ESCITALOPRAM OXALATE 20 MG/1
20 TABLET ORAL DAILY
Qty: 90 TABLET | Refills: 3 | OUTPATIENT
Start: 2024-09-13

## 2024-09-30 ENCOUNTER — TELEPHONE (OUTPATIENT)
Dept: BEHAVIORAL HEALTH | Facility: CLINIC | Age: 44
End: 2024-09-30
Payer: COMMERCIAL

## 2024-10-01 DIAGNOSIS — F32.1 CURRENT MODERATE EPISODE OF MAJOR DEPRESSIVE DISORDER, UNSPECIFIED WHETHER RECURRENT (MULTI): Primary | ICD-10-CM

## 2024-10-01 DIAGNOSIS — F33.8 SEASONAL AFFECTIVE DISORDER (CMS-HCC): ICD-10-CM

## 2024-10-01 DIAGNOSIS — F41.9 ANXIETY: ICD-10-CM

## 2024-10-01 RX ORDER — ESCITALOPRAM OXALATE 20 MG/1
20 TABLET ORAL DAILY
Qty: 90 TABLET | Refills: 3 | Status: SHIPPED | OUTPATIENT
Start: 2024-10-01 | End: 2025-10-01

## 2024-11-04 DIAGNOSIS — F41.0 SEVERE ANXIETY WITH PANIC: Primary | ICD-10-CM

## 2024-11-04 RX ORDER — ALPRAZOLAM 1 MG/1
1 TABLET ORAL DAILY PRN
Qty: 7 TABLET | Refills: 0 | Status: SHIPPED | OUTPATIENT
Start: 2024-11-04

## 2024-11-04 NOTE — PROGRESS NOTES
Reviewed OARRS on 11/04/2024 by KATHY Ford -OARRS has been reviewed and is consistent with prescribed medications, Considered the risks of abuse, dependence, addiction and diversion, Medication is felt to be clinically appropriate based on documented diagnosis    Pt is requesting refill of Xanax - first time in over 2 years, which reflects that he only uses it only as needed.

## 2024-12-04 ENCOUNTER — APPOINTMENT (OUTPATIENT)
Dept: BEHAVIORAL HEALTH | Facility: CLINIC | Age: 44
End: 2024-12-04
Payer: COMMERCIAL

## 2024-12-04 DIAGNOSIS — F32.5 MAJOR DEPRESSION IN REMISSION (CMS-HCC): ICD-10-CM

## 2024-12-04 DIAGNOSIS — F42.8 OBSESSIVE THINKING: ICD-10-CM

## 2024-12-04 DIAGNOSIS — F41.1 GAD (GENERALIZED ANXIETY DISORDER): Primary | ICD-10-CM

## 2024-12-04 DIAGNOSIS — F33.8 SEASONAL AFFECTIVE DISORDER (CMS-HCC): ICD-10-CM

## 2024-12-04 PROCEDURE — 99213 OFFICE O/P EST LOW 20 MIN: CPT | Performed by: NURSE PRACTITIONER

## 2024-12-04 ASSESSMENT — PATIENT HEALTH QUESTIONNAIRE - PHQ9
8. MOVING OR SPEAKING SO SLOWLY THAT OTHER PEOPLE COULD HAVE NOTICED. OR THE OPPOSITE, BEING SO FIGETY OR RESTLESS THAT YOU HAVE BEEN MOVING AROUND A LOT MORE THAN USUAL: NOT AT ALL
7. TROUBLE CONCENTRATING ON THINGS, SUCH AS READING THE NEWSPAPER OR WATCHING TELEVISION: SEVERAL DAYS
9. THOUGHTS THAT YOU WOULD BE BETTER OFF DEAD, OR OF HURTING YOURSELF: NOT AT ALL
6. FEELING BAD ABOUT YOURSELF - OR THAT YOU ARE A FAILURE OR HAVE LET YOURSELF OR YOUR FAMILY DOWN: NOT AT ALL
5. POOR APPETITE OR OVEREATING: NOT AT ALL
1. LITTLE INTEREST OR PLEASURE IN DOING THINGS: NOT AT ALL
2. FEELING DOWN, DEPRESSED OR HOPELESS: NOT AT ALL
4. FEELING TIRED OR HAVING LITTLE ENERGY: SEVERAL DAYS
3. TROUBLE FALLING OR STAYING ASLEEP OR SLEEPING TOO MUCH: NOT AT ALL
10. IF YOU CHECKED OFF ANY PROBLEMS, HOW DIFFICULT HAVE THESE PROBLEMS MADE IT FOR YOU TO DO YOUR WORK, TAKE CARE OF THINGS AT HOME, OR GET ALONG WITH OTHER PEOPLE: NOT DIFFICULT AT ALL

## 2024-12-04 ASSESSMENT — ANXIETY QUESTIONNAIRES
3. WORRYING TOO MUCH ABOUT DIFFERENT THINGS: SEVERAL DAYS
6. BECOMING EASILY ANNOYED OR IRRITABLE: SEVERAL DAYS
GAD7 TOTAL SCORE: 5
IF YOU CHECKED OFF ANY PROBLEMS ON THIS QUESTIONNAIRE, HOW DIFFICULT HAVE THESE PROBLEMS MADE IT FOR YOU TO DO YOUR WORK, TAKE CARE OF THINGS AT HOME, OR GET ALONG WITH OTHER PEOPLE: NOT DIFFICULT AT ALL
4. TROUBLE RELAXING: SEVERAL DAYS
2. NOT BEING ABLE TO STOP OR CONTROL WORRYING: SEVERAL DAYS
1. FEELING NERVOUS, ANXIOUS, OR ON EDGE: SEVERAL DAYS
5. BEING SO RESTLESS THAT IT IS HARD TO SIT STILL: NOT AT ALL
7. FEELING AFRAID AS IF SOMETHING AWFUL MIGHT HAPPEN: NOT AT ALL

## 2024-12-04 NOTE — PROGRESS NOTES
"Adult Ambulatory Psychiatry Progress Note      Assessment/Plan     Impression:  Boo De La O is a 44 y.o. male domiciled , employed as manager at [employer], who presents for follow up with CC of \"I am good enough. I reduced the Lexapro back to 20mg and my sleep had gotten better as I was buzzing. I did have a few bouts of depression and anxiety but I made it through it.\"    Plan: Patient was engaging, cooperative and calm. Outside of some reported anxiety, and depression that was fleeting, and reduced Lexapro on his own as he realized the 25mg was contributing to it, as to strong of a dose currently, and now feels stable. Reviewed and agreed that  no changes to medications and treatment plan are needed.     Medication: Lexapro 20mg every day, Wellbutrin XL 300mg every day, Xanax 1mg PRN (no refills).    Diagnoses and all orders for this visit:  JAMISON (generalized anxiety disorder)  Seasonal affective disorder (CMS-HCC)  Obsessive thinking  Major depression in remission (CMS-HCC)      Therapy: none    Other: n/a    Subjective   HPI:     Virtual Consent    An interactive audio and video telecommunication system which permits real time communications between the patient (at work) and provider (at home office) was utilized to provide this telehealth service.   Verbal consent was requested and obtained from Boo De La O on this date, 12/04/24 for a telehealth visit.     Present Illness - anxiety     Recent psychiatric symptoms (pertinent positives and negatives) - reports relationship with wife is 'ok. At least she is now admitting she is dealing with francesco-menopause and will see her OB/GYN in Feb. We are not fighting as much, but we are not communicating about muhc as well. Just I am here with my girls and enjoying the good times. If the girls weren't here, I would be gone.' Reports work is fine, but does an opportunity to move into another division that has growth opportunity and a $20-30k salary increase. 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 Reports overall anxiety and depression are stable with current medications. Had been on the 25mg of Lexapro, but found that to be too much for him and it would leave him with racing thoughts at night and be wide awake for an hour until he would fall asleep. Sleep was down to 6 hours a night, but once he stopped that, his sleep got better and averages 7-8 hrs a night, especially when when he wants to get up earlier to get to the gym by 6am. He has had to use his Xanax a few more times than usual over the past few weeks because he noticed that his anxiety and depression even were higher than usual, leaving him feeling more edgy and down than usual. Reports appetite is 'good'. Denies racing, obsessive, ruminating or intrusive thoughts. Energy levels and mental/physical fatigue are fine outside of when he was sick with 20 days of having a cold and then sinus infection.     Onset/timeframe - 2 months  Type - anxiety  Duration - situational  Aggravating and/or relieving factors/triggers - only has anxiety when dealing with his wife's mood swings and behaviors, and situational stress in life but otherwise feels stable as he felt the 25mg dose of Lexapro was leaving him too anxious and reduced it over the past month.  Treatment and treatment changes (new meds, dosage increases or decreases, med compliance, therapy frequency, etc.) (Past and Recent) - Lexapro 20mg QD, Wellbutrin XL 300mg QD, Xanax 1mg PRN (no refills). Doing  sessions every 1-2 weeks, Edgar Lomas. Marriage counselor encouraged patient to do  to do middle ground therapy for his wife, while his wife continues her own therapy, and search for a new couples therapist.      Issues: Denies SI/HI/AVH, currently.            Review of Systems   Psychiatric/Behavioral:  The patient is nervous/anxious.    All other systems reviewed and are negative.      OARRS: Reviewed OARRS on 12/04/2024 by YOUSIF Ford-CNP -OARRS has been reviewed and is consistent with prescribed medications, Considered the risks of abuse, dependence, addiction and diversion, Medication is felt to be clinically appropriate based on documented diagnosis    I have personally reviewed the OARRS report for Boo De La O. I have considered the risks of abuse, dependence, addiction and diversion    Is the patient prescribed a combination of a benzodiazepine and opioid?  Yes, I feel it is clincially indicated to continue the medication and have discussed with the patient risks/benefits/alternatives.    Last Urine Drug Screen / ordered today: No    Controlled Substance Agreement:  Date of the Last Agreement: 02/02/2024  I attest that the agreement was reviewed and the patient is using the drug as prescribed.    Benzodiazepines:  What is the patient's goal of therapy? Control over pain attacks  Is this being achieved with current treatment? yes    Activities of Daily Living:   Is your overall impression that this patient is benefiting (symptom reduction outweighs side effects) from benzodiazepine therapy? Yes     1. Physical Functioning: Better  2. Family Relationship: Better  3. Social Relationship: Better  4. Mood: Better  5. Sleep Patterns: Better  6. Overall Function: Better    Objective   Mental Status Exam:  General Appearance: Well groomed, appropriate eye contact  Attitude/Behavior: Cooperative  Motor: No psychomotor agitation or retardation, no tremor or other abnormal movements  Speech: Normal rate, volume, prosody  Gait/Station: Other:(comment) (sitting in front of work computer, at work, over virtual connection)  Mood: 'good'  Affect: Euthymic, full-range  Thought Process: Linear, goal directed  Thought Associations: No loosening of  associations  Thought Content: Normal  Perception: No perceptual abnormalities noted  Sensorium: Alert and oriented to person, place, time and situation  Insight: Intact  Judgement: Intact  Cognition: Cognitively intact to conversational testing with respect to attention, orientation, fund of knowledge, recent and remote memory, and language  Testing: N/A    JAMISON-7/PHQ-9 scores reviewed: 5, 2 compared to 1, 0 reflecting mild increase in anxiety but overall stable mood/depression.    Current Medications:  Current Outpatient Medications on File Prior to Visit   Medication Sig Dispense Refill    ALPRAZolam (Xanax) 1 mg tablet Take 1 tablet (1 mg) by mouth once daily as needed for anxiety. 7 tablet 0    buPROPion XL (Wellbutrin XL) 300 mg 24 hr tablet Take 1 tablet (300 mg) by mouth once daily. 90 tablet 3    calcium carbonate-vitamin D3 500 mg-5 mcg (200 unit) tablet Take 1 tablet by mouth once daily.      escitalopram (Lexapro) 20 mg tablet Take 1 tablet (20 mg) by mouth once daily. 90 tablet 3    escitalopram (Lexapro) 5 mg tablet Take 1 tablet (5 mg) by mouth once daily. 90 tablet 3    esomeprazole (NexIUM) 40 mg packet Take 20 mg by mouth once daily in the morning. Take before meals.      magnesium glycinate 100 mg magnesium capsule Take 1 capsule (100 mg) by mouth once daily.       No current facility-administered medications on file prior to visit.       Lab Review:   not applicable      Time Spent:    Prep time: 1 min.  Direct patient time: 25 min.  Documentation time: 5 min.   Total time: 30 min.    Next Appointment:  Follow up in 13 weeks (on 3/5/2025).

## 2024-12-08 ASSESSMENT — ENCOUNTER SYMPTOMS: NERVOUS/ANXIOUS: 1

## 2025-01-24 ENCOUNTER — APPOINTMENT (OUTPATIENT)
Dept: PRIMARY CARE | Facility: CLINIC | Age: 45
End: 2025-01-24
Payer: COMMERCIAL

## 2025-03-05 ENCOUNTER — APPOINTMENT (OUTPATIENT)
Dept: BEHAVIORAL HEALTH | Facility: CLINIC | Age: 45
End: 2025-03-05
Payer: COMMERCIAL

## 2025-03-05 DIAGNOSIS — F32.5 MAJOR DEPRESSION IN REMISSION (CMS-HCC): ICD-10-CM

## 2025-03-05 DIAGNOSIS — F41.1 GAD (GENERALIZED ANXIETY DISORDER): Primary | ICD-10-CM

## 2025-03-05 DIAGNOSIS — F33.8 SEASONAL AFFECTIVE DISORDER (CMS-HCC): ICD-10-CM

## 2025-03-05 DIAGNOSIS — F42.8 OBSESSIVE THINKING: ICD-10-CM

## 2025-03-05 PROCEDURE — 99214 OFFICE O/P EST MOD 30 MIN: CPT | Performed by: NURSE PRACTITIONER

## 2025-03-05 NOTE — PROGRESS NOTES
"Adult Ambulatory Psychiatry Progress Note      Assessment/Plan     Impression:  Boo De La O is a 44 y.o. male domiciled , employed as manager at [employer], who presents for follow up with CC of \"I am doing ok in the [employer] and I am fine with those in the division who took the early severance or intermediate packages and they were the lazy ones, so bye! But in my area, my management has been a pain in my ass, so I found out there is another job in a different area, I applied for it and am leaving this division and my bosses are freaking out. I am going to get paid better, but also I have my second interview with Pedro Holman tomorrow so who knows where I may end up. I feel pretty good today. Though there have been a couple rough patches over the past 3 months - not bad, just rough. Some sickness times with everyone in the family - flu, cold, and I tweaked my back. Ronnie Garrett is now on HRT.\"    Plan: Patient was engaging, cooperative and calm. Outside of mild work stress - switching to a new department at work because of lack of professionalism but also has opportunity for new job outside of current workplace, feels overall stable. Reviewed and agreed that no changes to medications and treatment plan are needed.     Medication: Lexapro 20mg every day, Wellbutrin XL 300mg every day, Xanax 1mg PRN (no refills).    Diagnoses and all orders for this visit:  JAMISON (generalized anxiety disorder)  Seasonal affective disorder (CMS-HCC)  Obsessive thinking  Major depression in remission (CMS-HCC)        Therapy: none    Other: n/a    Subjective   HPI:     Virtual Consent    An interactive audio and video telecommunication system which permits real time communications between the patient (at work) and provider (at home office) was utilized to provide this telehealth service.   Verbal consent was requested and obtained from Boo De La O on this date, 03/05/25 for a telehealth visit.     Present Illness - anxiety   "   Recent psychiatric symptoms (pertinent positives and negatives) - reports feeling overall 'good' in regards to anxiety and depression - even with his having to deal with bouts of illness in the family, being in his shop cutting wood, and relaxing at home playing with his kids, Jairo on his phone, skied 4x this winter season, his wife's hormonal issues which impacts her own mood/mental health getting better now being on HRT, and even stress at work d/t incompetent management and lazy coworkers. Reports his daughters are doing well, but his one is seeing a therapist already and she has a best friend who has her own severe mental health issues and he and his wife are wanting to make sure they support their daughter with understanding what her best friend is going through (currently attending a camp for children with SI). 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 Medications are doing well and uses Xanax appropriately - takes 1/2 of tab as it is 'enough to help me stay cool and not lose my shit during these meetings at work. My one boss told me that I am gritting my teeth for you, when we meet and I don't know how you manage to not blow your top.' Sleep has been 'good' and achieves 8 hours a night on average. Has his AM coffee multiple times in the morning. Reports appetite remains 'good'. Still denies racing, obsessive, ruminating or intrusive thoughts. 'but if they do happen they are short lived and I calm down quickly.' Energy levels and mental/physical fatigue are 'pretty good. Not great like summer time outdoor fun, but not bad since it is wintertime and it limits what one can do.' Denies S.A.D.     Onset/timeframe - 2 months  Type - anxiety  Duration -  situational  Aggravating and/or relieving factors/triggers - only has anxiety with associated with work stress - but is moving to a different department where he would work with more competent individuals but has opportunity to work with a private company with much potential that he is excited about so overall feels stable.  Treatment and treatment changes (new meds, dosage increases or decreases, med compliance, therapy frequency, etc.) (Past and Recent) - Lexapro 20mg QD, Wellbutrin XL 300mg QD, Xanax 1mg PRN (no refills). Doing  sessions every 1-2 weeks, Edgar Lomas. Marriage counselor encouraged patient to do  to do middle ground therapy for his wife, while his wife continues her own therapy, and search for a new couples therapist.      Issues: Denies SI/HI/AVH, currently.           Review of Systems   Psychiatric/Behavioral: Negative.         OARRS: Reviewed OARRS on 03/08/2025 by YOUSIF Ford-CNP -OARRS has been reviewed and is consistent with prescribed medications, Considered the risks of abuse, dependence, addiction and diversion, Medication is felt to be clinically appropriate based on documented diagnosis    I have personally reviewed the OARRS report for Boo De La O. I have considered the risks of abuse, dependence, addiction and diversion    Is the patient prescribed a combination of a benzodiazepine and opioid?  Yes, I feel it is clincially indicated to continue the medication and have discussed with the patient risks/benefits/alternatives.    Last Urine Drug Screen / ordered today: No    Controlled Substance Agreement:  Date of the Last Agreement: 02/02/2024  I attest that the agreement was reviewed and the patient is using the drug as prescribed.    Benzodiazepines:  What is the patient's goal of therapy? Control over pain attacks  Is this being achieved with current treatment? yes    Activities of Daily Living:   Is your overall impression that this patient is  benefiting (symptom reduction outweighs side effects) from benzodiazepine therapy? Yes     1. Physical Functioning: Better  2. Family Relationship: Better  3. Social Relationship: Better  4. Mood: Better  5. Sleep Patterns: Better  6. Overall Function: Better    Objective   Mental Status Exam:  General Appearance: Well groomed, appropriate eye contact  Attitude/Behavior: Cooperative  Motor: No psychomotor agitation or retardation, no tremor or other abnormal movements  Speech: Normal rate, volume, prosody  Gait/Station: Other:(comment) (sitting in office at work, over virtual connection)  Mood: 'good overall'  Affect: Euthymic, full-range, Congruent with mood and topic of conversation  Thought Process: Linear, goal directed  Thought Associations: No loosening of associations  Thought Content: Normal  Perception: No perceptual abnormalities noted  Sensorium: Alert and oriented to person, place, time and situation  Insight: Intact  Judgement: Intact  Cognition: Cognitively intact to conversational testing with respect to attention, orientation, fund of knowledge, recent and remote memory, and language  Testing: N/A    JAMISON-7/PHQ-9 scores reviewed: 2, 1 compared to 5, 2 reflecting improvement in anxiety and depression.    Current Medications:  Current Outpatient Medications on File Prior to Visit   Medication Sig Dispense Refill    ALPRAZolam (Xanax) 1 mg tablet Take 1 tablet (1 mg) by mouth once daily as needed for anxiety. 7 tablet 0    buPROPion XL (Wellbutrin XL) 300 mg 24 hr tablet Take 1 tablet (300 mg) by mouth once daily. 90 tablet 3    calcium carbonate-vitamin D3 500 mg-5 mcg (200 unit) tablet Take 1 tablet by mouth once daily.      escitalopram (Lexapro) 20 mg tablet Take 1 tablet (20 mg) by mouth once daily. 90 tablet 3    escitalopram (Lexapro) 5 mg tablet Take 1 tablet (5 mg) by mouth once daily. 90 tablet 3    esomeprazole (NexIUM) 40 mg packet Take 20 mg by mouth once daily in the morning. Take before  meals.      magnesium glycinate 100 mg magnesium capsule Take 1 capsule (100 mg) by mouth once daily.       No current facility-administered medications on file prior to visit.       Lab Review:   not applicable      Time Spent:    Prep time: 1 min.  Direct patient time: 29 min.  Documentation time: 5 min.   Total time: 35 min.    Next Appointment:  Follow up in 13 weeks (on 6/4/2025).

## 2025-03-08 ASSESSMENT — ENCOUNTER SYMPTOMS: PSYCHIATRIC NEGATIVE: 1

## 2025-03-08 ASSESSMENT — PATIENT HEALTH QUESTIONNAIRE - PHQ9
8. MOVING OR SPEAKING SO SLOWLY THAT OTHER PEOPLE COULD HAVE NOTICED. OR THE OPPOSITE, BEING SO FIGETY OR RESTLESS THAT YOU HAVE BEEN MOVING AROUND A LOT MORE THAN USUAL: NOT AT ALL
7. TROUBLE CONCENTRATING ON THINGS, SUCH AS READING THE NEWSPAPER OR WATCHING TELEVISION: NOT AT ALL
9. THOUGHTS THAT YOU WOULD BE BETTER OFF DEAD, OR OF HURTING YOURSELF: NOT AT ALL
2. FEELING DOWN, DEPRESSED OR HOPELESS: SEVERAL DAYS
5. POOR APPETITE OR OVEREATING: NOT AT ALL
10. IF YOU CHECKED OFF ANY PROBLEMS, HOW DIFFICULT HAVE THESE PROBLEMS MADE IT FOR YOU TO DO YOUR WORK, TAKE CARE OF THINGS AT HOME, OR GET ALONG WITH OTHER PEOPLE: NOT DIFFICULT AT ALL
4. FEELING TIRED OR HAVING LITTLE ENERGY: NOT AT ALL
6. FEELING BAD ABOUT YOURSELF - OR THAT YOU ARE A FAILURE OR HAVE LET YOURSELF OR YOUR FAMILY DOWN: NOT AT ALL
3. TROUBLE FALLING OR STAYING ASLEEP OR SLEEPING TOO MUCH: NOT AT ALL
1. LITTLE INTEREST OR PLEASURE IN DOING THINGS: NOT AT ALL

## 2025-03-08 ASSESSMENT — ANXIETY QUESTIONNAIRES
4. TROUBLE RELAXING: NOT AT ALL
IF YOU CHECKED OFF ANY PROBLEMS ON THIS QUESTIONNAIRE, HOW DIFFICULT HAVE THESE PROBLEMS MADE IT FOR YOU TO DO YOUR WORK, TAKE CARE OF THINGS AT HOME, OR GET ALONG WITH OTHER PEOPLE: NOT DIFFICULT AT ALL
7. FEELING AFRAID AS IF SOMETHING AWFUL MIGHT HAPPEN: NOT AT ALL
GAD7 TOTAL SCORE: 2
5. BEING SO RESTLESS THAT IT IS HARD TO SIT STILL: NOT AT ALL
1. FEELING NERVOUS, ANXIOUS, OR ON EDGE: SEVERAL DAYS
2. NOT BEING ABLE TO STOP OR CONTROL WORRYING: NOT AT ALL
6. BECOMING EASILY ANNOYED OR IRRITABLE: NOT AT ALL
3. WORRYING TOO MUCH ABOUT DIFFERENT THINGS: SEVERAL DAYS

## 2025-06-04 ENCOUNTER — APPOINTMENT (OUTPATIENT)
Dept: BEHAVIORAL HEALTH | Facility: CLINIC | Age: 45
End: 2025-06-04
Payer: COMMERCIAL

## 2025-06-04 DIAGNOSIS — F41.9 ANXIETY DISORDER, UNSPECIFIED: ICD-10-CM

## 2025-06-04 DIAGNOSIS — F42.8 OBSESSIVE THINKING: ICD-10-CM

## 2025-06-04 DIAGNOSIS — F32.5 MAJOR DEPRESSION IN REMISSION: ICD-10-CM

## 2025-06-04 DIAGNOSIS — F41.1 GAD (GENERALIZED ANXIETY DISORDER): Primary | ICD-10-CM

## 2025-06-04 PROCEDURE — 99214 OFFICE O/P EST MOD 30 MIN: CPT | Performed by: NURSE PRACTITIONER

## 2025-06-04 ASSESSMENT — ANXIETY QUESTIONNAIRES
5. BEING SO RESTLESS THAT IT IS HARD TO SIT STILL: NOT AT ALL
6. BECOMING EASILY ANNOYED OR IRRITABLE: NOT AT ALL
7. FEELING AFRAID AS IF SOMETHING AWFUL MIGHT HAPPEN: NOT AT ALL
3. WORRYING TOO MUCH ABOUT DIFFERENT THINGS: NOT AT ALL
4. TROUBLE RELAXING: NOT AT ALL
GAD7 TOTAL SCORE: 0
IF YOU CHECKED OFF ANY PROBLEMS ON THIS QUESTIONNAIRE, HOW DIFFICULT HAVE THESE PROBLEMS MADE IT FOR YOU TO DO YOUR WORK, TAKE CARE OF THINGS AT HOME, OR GET ALONG WITH OTHER PEOPLE: NOT DIFFICULT AT ALL
2. NOT BEING ABLE TO STOP OR CONTROL WORRYING: NOT AT ALL
1. FEELING NERVOUS, ANXIOUS, OR ON EDGE: NOT AT ALL

## 2025-06-04 ASSESSMENT — PATIENT HEALTH QUESTIONNAIRE - PHQ9
8. MOVING OR SPEAKING SO SLOWLY THAT OTHER PEOPLE COULD HAVE NOTICED. OR THE OPPOSITE, BEING SO FIGETY OR RESTLESS THAT YOU HAVE BEEN MOVING AROUND A LOT MORE THAN USUAL: NOT AT ALL
6. FEELING BAD ABOUT YOURSELF - OR THAT YOU ARE A FAILURE OR HAVE LET YOURSELF OR YOUR FAMILY DOWN: NOT AT ALL
10. IF YOU CHECKED OFF ANY PROBLEMS, HOW DIFFICULT HAVE THESE PROBLEMS MADE IT FOR YOU TO DO YOUR WORK, TAKE CARE OF THINGS AT HOME, OR GET ALONG WITH OTHER PEOPLE: NOT DIFFICULT AT ALL
3. TROUBLE FALLING OR STAYING ASLEEP OR SLEEPING TOO MUCH: NOT AT ALL
7. TROUBLE CONCENTRATING ON THINGS, SUCH AS READING THE NEWSPAPER OR WATCHING TELEVISION: NOT AT ALL
2. FEELING DOWN, DEPRESSED OR HOPELESS: NOT AT ALL
5. POOR APPETITE OR OVEREATING: NOT AT ALL
4. FEELING TIRED OR HAVING LITTLE ENERGY: NOT AT ALL
9. THOUGHTS THAT YOU WOULD BE BETTER OFF DEAD, OR OF HURTING YOURSELF: NOT AT ALL
1. LITTLE INTEREST OR PLEASURE IN DOING THINGS: NOT AT ALL

## 2025-06-04 ASSESSMENT — ENCOUNTER SYMPTOMS: PSYCHIATRIC NEGATIVE: 1

## 2025-06-04 NOTE — PROGRESS NOTES
"Adult Ambulatory Psychiatry Progress Note      Assessment/Plan     Impression:  Boo De La O is a 44 y.o. male domiciled , employed as manager at [employer], who presents for follow up with CC of \"I am great. I feeling really good. Cleaned up my diet, going to the gym. Mentally feeling good.\"    Plan: Patient was engaging, cooperative and calm. Outside of mild work stress, but working in a new less stressful work department, overall reports and presents himself to be stable. Taking Lexapro 15mg instead of the 20mg dose as discussed. Reviewed and agreed that no changes to medications and treatment plan are needed.     Medication: Lexapro 15mg every day, Wellbutrin XL 300mg every day, Xanax 1mg PRN (no refills).    Diagnoses and all orders for this visit:  JAMISON (generalized anxiety disorder)  Anxiety disorder, unspecified  -     escitalopram (Lexapro) 5 mg tablet; Take 1 tablet (5 mg) by mouth once daily.  Major depression in remission  -     escitalopram (Lexapro) 5 mg tablet; Take 1 tablet (5 mg) by mouth once daily.  -     buPROPion XL (Wellbutrin XL) 300 mg 24 hr tablet; Take 1 tablet (300 mg) by mouth once daily.  Obsessive thinking        Therapy: none    Other: n/a    Subjective   HPI:     Virtual Consent    An interactive audio and video telecommunication system which permits real time communications between the patient (at work) and provider (at home office) was utilized to provide this telehealth service.   Verbal consent was requested and obtained from Boo De La O on this date, 06/04/25 for a telehealth visit.     Present Illness - anxiety     Recent psychiatric symptoms (pertinent positives and negatives) - reports feeling 'good' with his anxiety and depression. Reports his relationship with his wife is 'stable' now that she is on HRT (even after an initial gordy period of adjusting to the hormones that threw her into a rage). Reports his daughters are doing well, and his wife is adjusting with " the new [facility] administration in charge and how she has to navigate the flores of the changes. 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 Sleep has been 'good' and achieves 8 hours a night on average. Has his AM coffee multiple times in the morning. Reports appetite remains 'good'. Still denies racing, obsessive, ruminating or intrusive thoughts. Energy levels and mental/physical fatigue are 'good.' Admits transferring from his old department (toxic work environment) to the new one, helped alleviate his stress levels, helped him realize that he has not needed to use any of his Xanax in a long time. Reports dropping his Lexapro to 15mg.      Onset/timeframe - 2 months  Type - anxiety  Duration - situational  Aggravating and/or relieving factors/triggers - only has anxiety with associated with work stress - but as he is in a different department now, work stress is much more manageable, and while [employer] is undergoing restructuring, he is looking to go work for a private company so overall feels anxiety is manageable.  Treatment and treatment changes (new meds, dosage increases or decreases, med compliance, therapy frequency, etc.) (Past and Recent) - Lexapro 15mg QD, Wellbutrin XL 300mg QD, Xanax 1mg PRN (no refills). Doing  sessions every 1-2 weeks, Edgar Lomas. Marriage counselor encouraged patient to do  to do middle ground therapy for his wife, while his wife continues her own therapy, and search for a new couples therapist.      Issues: Denies SI/HI/AVH, currently.     Reports wife was put on HRT patch and initially it threw her mood all over the place, that even their daughters noticed how awful her mood was becoming, but after a few weeks, it had settled down and she was much better to be around. She is dealing with now some GI issues and  other health concerns that run in the family.          Review of Systems   Psychiatric/Behavioral: Negative.         OARRS: Reviewed OARRS on 06/05/2025 by YOUSIF Ford-CNP -OARRS has been reviewed and is consistent with prescribed medications, Considered the risks of abuse, dependence, addiction and diversion, Medication is felt to be clinically appropriate based on documented diagnosis    I have personally reviewed the OARRS report for Boo De La O. I have considered the risks of abuse, dependence, addiction and diversion    Is the patient prescribed a combination of a benzodiazepine and opioid?  Yes, I feel it is clincially indicated to continue the medication and have discussed with the patient risks/benefits/alternatives.    Last Urine Drug Screen / ordered today: No    Controlled Substance Agreement:  Date of the Last Agreement: 02/02/2024  I attest that the agreement was reviewed and the patient is using the drug as prescribed.    Benzodiazepines:  What is the patient's goal of therapy? Control over pain attacks  Is this being achieved with current treatment? yes    Activities of Daily Living:   Is your overall impression that this patient is benefiting (symptom reduction outweighs side effects) from benzodiazepine therapy? Yes     1. Physical Functioning: Better  2. Family Relationship: Better  3. Social Relationship: Better  4. Mood: Better  5. Sleep Patterns: Better  6. Overall Function: Better    Objective   Mental Status Exam:  General Appearance: Well groomed, appropriate eye contact  Attitude/Behavior: Cooperative  Motor: No psychomotor agitation or retardation, no tremor or other abnormal movements  Speech: Normal rate, volume, prosody  Gait/Station: Other:(comment) (sitting outside on a bench at work, over virtual connection)  Mood: 'good'  Affect: Euthymic, full-range, Congruent with mood and topic of conversation  Thought Process: Linear, goal directed  Thought Associations: No loosening  of associations  Thought Content: Normal  Perception: No perceptual abnormalities noted  Sensorium: Alert and oriented to person, place, time and situation  Insight: Intact  Judgement: Intact  Cognition: Cognitively intact to conversational testing with respect to attention, orientation, fund of knowledge, recent and remote memory, and language  Testing: N/A    JAMISON-7/PHQ-9 scores reviewed: 0, 0 compared to 2, 1 reflecting stable anxiety and depression.    Current Medications:  Current Outpatient Medications on File Prior to Visit   Medication Sig Dispense Refill    ALPRAZolam (Xanax) 1 mg tablet Take 1 tablet (1 mg) by mouth once daily as needed for anxiety. 7 tablet 0    calcium carbonate-vitamin D3 500 mg-5 mcg (200 unit) tablet Take 1 tablet by mouth once daily.      escitalopram (Lexapro) 20 mg tablet Take 1 tablet (20 mg) by mouth once daily. 90 tablet 3    esomeprazole (NexIUM) 40 mg packet Take 20 mg by mouth once daily in the morning. Take before meals.      magnesium glycinate 100 mg magnesium capsule Take 1 capsule (100 mg) by mouth once daily.      [DISCONTINUED] buPROPion XL (Wellbutrin XL) 300 mg 24 hr tablet Take 1 tablet (300 mg) by mouth once daily. 90 tablet 3    [DISCONTINUED] escitalopram (Lexapro) 5 mg tablet Take 1 tablet (5 mg) by mouth once daily. 90 tablet 3     No current facility-administered medications on file prior to visit.       Lab Review:   not applicable      Time Spent:    Prep time: 1 min.  Direct patient time: 25 min.  Documentation time: 5 min.   Total time: 31 min.    Next Appointment:  Follow up in 3 months (on 9/4/2025).

## 2025-06-05 RX ORDER — BUPROPION HYDROCHLORIDE 300 MG/1
300 TABLET ORAL DAILY
Qty: 90 TABLET | Refills: 3 | Status: SHIPPED | OUTPATIENT
Start: 2025-06-05 | End: 2026-06-05

## 2025-06-05 RX ORDER — ESCITALOPRAM OXALATE 5 MG/1
5 TABLET ORAL DAILY
Qty: 90 TABLET | Refills: 3 | Status: SHIPPED | OUTPATIENT
Start: 2025-06-05 | End: 2026-06-05

## 2025-06-20 ENCOUNTER — TELEPHONE (OUTPATIENT)
Dept: BEHAVIORAL HEALTH | Facility: CLINIC | Age: 45
End: 2025-06-20
Payer: COMMERCIAL

## 2025-06-20 DIAGNOSIS — F41.0 SEVERE ANXIETY WITH PANIC: Primary | ICD-10-CM

## 2025-06-20 RX ORDER — ALPRAZOLAM 1 MG/1
1 TABLET ORAL DAILY PRN
Qty: 7 TABLET | Refills: 0 | Status: SHIPPED | OUTPATIENT
Start: 2025-06-20

## 2025-06-20 NOTE — PROGRESS NOTES
Pt sent in request to have his Xanax refilled. Went ahead and authorized it. 7 tablets of Xanax 1mg PRN/QD.    Reviewed OARRS on 06/20/2025 by KATHY Ford -OARRS has been reviewed and is consistent with prescribed medications, Considered the risks of abuse, dependence, addiction and diversion, Medication is felt to be clinically appropriate based on documented diagnosis

## 2025-09-04 ENCOUNTER — APPOINTMENT (OUTPATIENT)
Dept: BEHAVIORAL HEALTH | Facility: CLINIC | Age: 45
End: 2025-09-04
Payer: COMMERCIAL

## 2025-09-04 PROBLEM — F41.0 SEVERE ANXIETY WITH PANIC: Status: ACTIVE | Noted: 2025-09-04

## 2025-09-04 ASSESSMENT — ANXIETY QUESTIONNAIRES
IF YOU CHECKED OFF ANY PROBLEMS ON THIS QUESTIONNAIRE, HOW DIFFICULT HAVE THESE PROBLEMS MADE IT FOR YOU TO DO YOUR WORK, TAKE CARE OF THINGS AT HOME, OR GET ALONG WITH OTHER PEOPLE: NOT DIFFICULT AT ALL
7. FEELING AFRAID AS IF SOMETHING AWFUL MIGHT HAPPEN: NOT AT ALL
5. BEING SO RESTLESS THAT IT IS HARD TO SIT STILL: NOT AT ALL
2. NOT BEING ABLE TO STOP OR CONTROL WORRYING: NOT AT ALL
GAD7 TOTAL SCORE: 2
4. TROUBLE RELAXING: SEVERAL DAYS
6. BECOMING EASILY ANNOYED OR IRRITABLE: NOT AT ALL
1. FEELING NERVOUS, ANXIOUS, OR ON EDGE: SEVERAL DAYS
3. WORRYING TOO MUCH ABOUT DIFFERENT THINGS: NOT AT ALL

## 2025-09-04 ASSESSMENT — PATIENT HEALTH QUESTIONNAIRE - PHQ9
10. IF YOU CHECKED OFF ANY PROBLEMS, HOW DIFFICULT HAVE THESE PROBLEMS MADE IT FOR YOU TO DO YOUR WORK, TAKE CARE OF THINGS AT HOME, OR GET ALONG WITH OTHER PEOPLE: NOT DIFFICULT AT ALL
9. THOUGHTS THAT YOU WOULD BE BETTER OFF DEAD, OR OF HURTING YOURSELF: NOT AT ALL
7. TROUBLE CONCENTRATING ON THINGS, SUCH AS READING THE NEWSPAPER OR WATCHING TELEVISION: SEVERAL DAYS
2. FEELING DOWN, DEPRESSED OR HOPELESS: NOT AT ALL
1. LITTLE INTEREST OR PLEASURE IN DOING THINGS: SEVERAL DAYS
5. POOR APPETITE OR OVEREATING: SEVERAL DAYS
8. MOVING OR SPEAKING SO SLOWLY THAT OTHER PEOPLE COULD HAVE NOTICED. OR THE OPPOSITE, BEING SO FIGETY OR RESTLESS THAT YOU HAVE BEEN MOVING AROUND A LOT MORE THAN USUAL: NOT AT ALL
4. FEELING TIRED OR HAVING LITTLE ENERGY: NOT AT ALL
6. FEELING BAD ABOUT YOURSELF - OR THAT YOU ARE A FAILURE OR HAVE LET YOURSELF OR YOUR FAMILY DOWN: NOT AT ALL
3. TROUBLE FALLING OR STAYING ASLEEP OR SLEEPING TOO MUCH: NOT AT ALL

## 2025-09-04 ASSESSMENT — ENCOUNTER SYMPTOMS: PSYCHIATRIC NEGATIVE: 1

## 2025-11-12 ENCOUNTER — APPOINTMENT (OUTPATIENT)
Dept: BEHAVIORAL HEALTH | Facility: CLINIC | Age: 45
End: 2025-11-12
Payer: COMMERCIAL